# Patient Record
Sex: FEMALE | Race: WHITE | NOT HISPANIC OR LATINO | Employment: OTHER | URBAN - METROPOLITAN AREA
[De-identification: names, ages, dates, MRNs, and addresses within clinical notes are randomized per-mention and may not be internally consistent; named-entity substitution may affect disease eponyms.]

---

## 2023-04-11 LAB
CHLAMYDIA TRACH., AMPLIFIED: NEGATIVE
N. GONORRHEA, AMPLIFIED: NEGATIVE
URINE CULTURE: NORMAL

## 2023-06-22 LAB
ABO GROUP (TYPE) IN BLOOD: NORMAL
ANTIBODY SCREEN: NORMAL
ERYTHROCYTE DISTRIBUTION WIDTH (RATIO) BY AUTOMATED COUNT: 13.2 % (ref 11.5–14.5)
ERYTHROCYTE MEAN CORPUSCULAR HEMOGLOBIN CONCENTRATION (G/DL) BY AUTOMATED: 34.1 G/DL (ref 32–36)
ERYTHROCYTE MEAN CORPUSCULAR VOLUME (FL) BY AUTOMATED COUNT: 91 FL (ref 80–100)
ERYTHROCYTES (10*6/UL) IN BLOOD BY AUTOMATED COUNT: 3.72 X10E12/L (ref 4–5.2)
HEMATOCRIT (%) IN BLOOD BY AUTOMATED COUNT: 34 % (ref 36–46)
HEMOGLOBIN (G/DL) IN BLOOD: 11.6 G/DL (ref 12–16)
LEUKOCYTES (10*3/UL) IN BLOOD BY AUTOMATED COUNT: 8.8 X10E9/L (ref 4.4–11.3)
PLATELETS (10*3/UL) IN BLOOD AUTOMATED COUNT: 267 X10E9/L (ref 150–450)
RH FACTOR: NORMAL

## 2023-06-23 ENCOUNTER — HOSPITAL ENCOUNTER (INPATIENT)
Dept: DATA CONVERSION | Facility: HOSPITAL | Age: 33
Discharge: HOME | End: 2023-06-23
Attending: OBSTETRICS & GYNECOLOGY | Admitting: OBSTETRICS & GYNECOLOGY

## 2023-06-23 DIAGNOSIS — O26.872 CERVICAL SHORTENING, SECOND TRIMESTER (HHS-HCC): ICD-10-CM

## 2023-06-23 DIAGNOSIS — J45.909 UNSPECIFIED ASTHMA, UNCOMPLICATED (HHS-HCC): ICD-10-CM

## 2023-06-23 DIAGNOSIS — Z3A.16 16 WEEKS GESTATION OF PREGNANCY (HHS-HCC): ICD-10-CM

## 2023-06-23 DIAGNOSIS — O99.512 DISEASES OF THE RESPIRATORY SYSTEM COMPLICATING PREGNANCY, SECOND TRIMESTER (HHS-HCC): ICD-10-CM

## 2023-06-23 DIAGNOSIS — O44.22: ICD-10-CM

## 2023-06-23 LAB
ABO GROUP (TYPE) IN BLOOD: NORMAL
ANTIBODY SCREEN: NORMAL
ERYTHROCYTE DISTRIBUTION WIDTH (RATIO) BY AUTOMATED COUNT: 13.2 % (ref 11.5–14.5)
ERYTHROCYTE MEAN CORPUSCULAR HEMOGLOBIN CONCENTRATION (G/DL) BY AUTOMATED: 33.6 G/DL (ref 32–36)
ERYTHROCYTE MEAN CORPUSCULAR VOLUME (FL) BY AUTOMATED COUNT: 94 FL (ref 80–100)
ERYTHROCYTES (10*6/UL) IN BLOOD BY AUTOMATED COUNT: 3.99 X10E12/L (ref 4–5.2)
HEMATOCRIT (%) IN BLOOD BY AUTOMATED COUNT: 37.5 % (ref 36–46)
HEMOGLOBIN (G/DL) IN BLOOD: 12.6 G/DL (ref 12–16)
LEUKOCYTES (10*3/UL) IN BLOOD BY AUTOMATED COUNT: 7.8 X10E9/L (ref 4.4–11.3)
NRBC (PER 100 WBCS) BY AUTOMATED COUNT: 0 /100 WBC (ref 0–0)
PLATELETS (10*3/UL) IN BLOOD AUTOMATED COUNT: 258 X10E9/L (ref 150–450)
RH FACTOR: NORMAL
SYPHILIS TOTAL AB: NONREACTIVE

## 2023-08-31 LAB
ABO GROUP (TYPE) IN BLOOD: NORMAL
ANTIBODY SCREEN: NORMAL
ERYTHROCYTE DISTRIBUTION WIDTH (RATIO) BY AUTOMATED COUNT: 12.7 % (ref 11.5–14.5)
ERYTHROCYTE MEAN CORPUSCULAR HEMOGLOBIN CONCENTRATION (G/DL) BY AUTOMATED: 33.8 G/DL (ref 32–36)
ERYTHROCYTE MEAN CORPUSCULAR VOLUME (FL) BY AUTOMATED COUNT: 94 FL (ref 80–100)
ERYTHROCYTES (10*6/UL) IN BLOOD BY AUTOMATED COUNT: 3.73 X10E12/L (ref 4–5.2)
GLUCOSE, 1 HR SCREEN, PREG: 104 MG/DL
HEMATOCRIT (%) IN BLOOD BY AUTOMATED COUNT: 35.2 % (ref 36–46)
HEMOGLOBIN (G/DL) IN BLOOD: 11.9 G/DL (ref 12–16)
HEPATITIS B VIRUS SURFACE AG PRESENCE IN SERUM: NONREACTIVE
HEPATITIS C VIRUS AB PRESENCE IN SERUM: NONREACTIVE
HIV 1/ 2 AG/AB SCREEN: NONREACTIVE
LEUKOCYTES (10*3/UL) IN BLOOD BY AUTOMATED COUNT: 7.5 X10E9/L (ref 4.4–11.3)
PLATELETS (10*3/UL) IN BLOOD AUTOMATED COUNT: 217 X10E9/L (ref 150–450)
REFLEX ADDED, ANEMIA PANEL: ABNORMAL
RH FACTOR: NORMAL
RUBELLA VIRUS IGG AB: POSITIVE
SYPHILIS TOTAL AB: NONREACTIVE

## 2023-09-03 LAB
HEMOGLOBIN A2: 2.8 %
HEMOGLOBIN A: 97.1 %
HEMOGLOBIN F: 0.1 %
HEMOGLOBIN IDENTIFICATION INTERPRETATION: NORMAL
PATH REVIEW-HGB IDENTIFICATION: NORMAL

## 2023-09-07 VITALS — BODY MASS INDEX: 25.1 KG/M2 | WEIGHT: 127.87 LBS | HEIGHT: 60 IN

## 2023-09-30 NOTE — H&P
HPI/OB History:   Prenatal Care Provider: Enio CADENA Descriptive Info:  ·  HPI    31yo  at 16.2wga by LMP c/w 12 week US presenting for Delaney cerclage placement.    Pregnancy Notable For  - short cervix 2.4 cm on  scan, no funneling  - marginal previa  - h/o PTB at 34 wga  - anxiety, clonazepam 0.5 mg daily, sees therapust  - ASCUS pap HPV other+ 2022  - exercise induced asthma    OBHx: 34w spontaneous  birth 4#6, 3d in NICU  GynHx: abnormal paps with colpo  MedHx: exercise induced asthma, GERD, anxiety  SurgHx: denies  Meds: PNV, unisom  All: NKDA  FamHx: noncontributory  SocHx: denies x.3      Prenatal Labs:   Prenatal Labs:    Prenatal Labs:   Blood Type: not ordered   Blood Type Comments: Selected manually 'not ordered'  at 2023 20:01   Rhogam Comments: Selected manually 'not ordered'  at 2023 20:01   Chlamydia Date: 10-Apr-2023   Chlamydia Results: negative   Chlamydia Comments: Selected manually 'negative'  at 2023 20:01   Gonorrhea Date: 10-Apr-2023   Gonorrhea Results: negative   Gonorrhea Comments: Selected manually 'negative'  at 2023 20:01   Strep Results: not ordered   Group B Strep Comments: Selected manually 'not  ordered' at 2023 20:01   HBsAG Results: not ordered   HBsAG Comments: Selected manually 'not ordered'  at 2023 20:01   HIV Results: not ordered   HIV Comments: Selected manually 'not ordered' at  2023 20:01   Rubella Results: not ordered   Rubella Comments: Selected manually 'not ordered'  at 2023 20:01   Syphilis Results: not ordered   Syphilis Comments: Selected manually 'not ordered'  at 2023 20:01     Antepartum/Postpartum:   Antepartum/PP:  Final SONYA 06-Dec-2023   Current EGA: 16.1   Patient is > or = 35.0 wks EGA no, EFW is n/a   Fetal Presentation not applicable   Fetal presentation verified by not applicable   Vaginal Bleeding No   Contractions/Abdominal Pain No   Discharge/Loss of Fluid No    Fetal Movement None   Hemorrhage Low Risk Factors (T&S) patient with no medium or high risk factors   Hemorrhage Risk Assessment hemorrhage risk completed on admission   Hemorrhage Risk Score Patient is at Low Risk for an OB hemorrhage. Order Type & Screen.   TOLAC no   Did this patient receive progesterone in any form to prevent premature delivery? no   Does patient desire postplacental IUD? no              Allergies:  ·  Ceclor : Rash    Medications Prior to Admission:   Admission Medication Reconciliation has not been completed for this patient.    Review of Systems:   All Other Systems: All other systems reviewed and  are negative     Objective:   Physical Exam by System:    Constitutional: No apparent distress.   Eyes: pupils equal, sclerae clear   Head/Neck: neck supple   Respiratory/Thorax: normal respiratory effort, lungs  clear, no wheezes or rhonchi   Cardiovascular: RRR   Gastrointestinal: Gravid, non tender   Musculoskeletal: Normal extremities   Neurological: no focal deficits   Psychological: Appropriate affect and mood   Skin: no rashes or lesions     Recent Lab Results:    Results:    CBC: 2023 10:07              \     Hgb     /                              \     11.6 L    /  WBC  ----------------  Plt               8.8       ----------------    267              /     Hct     \                              /     34.0 L    \            RBC: 3.72 L    MCV: 91         Assessment and Plan:   Assessment:    33yo  at 16.2wga by LMP c/w 12 week US presenting for Delaney cerclage placement.    Cerclage placement  - admit, consent obtained prior to ativan 1mg (anxiety)  - plan for cerclage placement today  - plan for spinal for cerclage placement  - patient will be obs for resolution of spinal  - if no complications, patient may be discharged home today     D/w Dr. Schaffer    Patient seen and evaluated with resident plan.  Symone Schaffer MD  MFJOSE Attending        Electronic  Signatures:  Magi Daily (Resident))  (Signed 22-Jun-2023 20:02)   Authored: HPI/OB History, Prenatal Labs, Antepartum/PP,  Allergies, Medications Prior to Admission, Review of Systems, Objective, Assessment and Plan  Symone Schaffer)  (Signed 23-Jun-2023 13:13)   Authored: Assessment and Plan, Note Completion      Last Updated: 23-Jun-2023 13:13 by Symone Schaffer)

## 2023-09-30 NOTE — H&P
HPI/OB History:   Prenatal Care Provider: Dr. Steen     HPI Descriptive Info:  ·  HPI    Anxious about wait time and upcoming procedure. Denies vaginal bleeding, leakage of fluid, abdominal pain.      Prenatal Labs:   Prenatal Labs:    Prenatal Labs:   Blood Typed Date: 23-Jun-2023   Blood Type: pending result   Blood Type Comments: Currently Collected as of  Jun 23 2023 12:00PM   Chlamydia Date: 10-Apr-2023   Chlamydia Results: negative   Chlamydia Comments: Selected manually 'negative'  at 22-Jun-2023 20:01   Gonorrhea Date: 10-Apr-2023   Gonorrhea Results: negative   Gonorrhea Comments: Selected manually 'negative'  at 22-Jun-2023 20:01   Strep Results: not ordered   Group B Strep Comments: Selected manually 'not  ordered' at 22-Jun-2023 20:01   HBsAG Results: not ordered   HBsAG Comments: Selected manually 'not ordered'  at 22-Jun-2023 20:01   HIV Results: not ordered   HIV Comments: Selected manually 'not ordered' at  22-Jun-2023 20:01   Rubella Results: not ordered   Rubella Comments: Selected manually 'not ordered'  at 22-Jun-2023 20:01   Syphilis (mmm-dd-yyyy): 23-Jun-2023   Syphilis Results: pending result   Syphilis Comments: Currently Collected as of Jun 23 2023 12:00PM     Antepartum/Postpartum:   Antepartum/PP:  Final SONYA 06-Dec-2023   Current EGA: 16.2   Patient is > or = 35.0 wks EGA no, EFW is n/a   Fetal Presentation not applicable   Fetal presentation verified by not applicable   Vaginal Bleeding No   Contractions/Abdominal Pain No   Discharge/Loss of Fluid No   Hemorrhage Low Risk Factors (T&S) patient with no medium or high risk factors   Hemorrhage Risk Assessment hemorrhage risk completed on admission   Hemorrhage Risk Score Patient is at Low Risk for an OB hemorrhage. Order Type & Screen.   TOLAC no   Did this patient receive progesterone in any form to prevent premature delivery? no   Does patient desire postplacental IUD? no       Social History:   Social History:  Smoking Status never  smoker (1)   Alcohol Use denies(1)              Allergies:  ·  Ceclor : Rash    Medications Prior to Admission:   Admission Medication Reconciliation has not been completed for this patient.    Objective:     Objective Information:      T   P  R  BP   MAP  SpO2   Value  36.6  70     105/51   74  100%  Date/Time  11:32  11:32    11:32   11:32  11:32  Range  (36.6C - 36.6C )  (70 - 70 )    (105 - 105 )/ (51 - 51 )  (74 - 74 )  (100% - 100% )    Physical Exam by System:    Constitutional: alert, oriented   Obstetric: Gravid abdomen     Recent Lab Results:    Results:    CBC: 2023 10:07              \     Hgb     /                              \     11.6 L    /  WBC  ----------------  Plt               8.8       ----------------    267              /     Hct     \                              /     34.0 L    \            RBC: 3.72 L    MCV: 91         Assessment and Plan:   Assessment:    31yo  at 16.2wga by LMP c/w 12 week US presenting for Delaney cerclage placement.      US and History indicated Cerclage  -Short cervix 2.4cm on  scan  - Admit to L&D, consented for procedure. Risks and benefits discussed with patient,  previously counseled outpatient.    Maternal well-being  - T&S/CBC on admission  -PNL's reviewed up to dateFetal well-being  -  on doppler     IUP @ 16.2wga  -PNL's reviewed UTD  -For outpatient follow up with MFM in 2 weeks  -For RTC prenatal visit in 4 weeks    d/w Dr. Sarbjit Jones MD PGY-2  MFM Pager [phone]       Attestation:   Note Completion:  I am a:  Resident/Fellow   Attending Attestation I saw and evaluated the patient.  I personally obtained the key and critical portions of the history and physical exam or was physically present for key and  critical portions performed by the resident/fellow. I reviewed the resident/fellow?s documentation and discussed the patient with the resident/fellow.  I agree with the resident/fellow?s medical  "decision making as documented in the note.     I personally evaluated the patient on 23-Jun-2023         Electronic Signatures:  Palomo Jones (Resident))  (Signed 23-Jun-2023 12:19)   Authored: HPI/OB History, Prenatal Labs, Antepartum/PP,  Social History, Allergies, Medications Prior to Admission, Objective, Assessment and Plan, Note Completion  Symone Schaffer)  (Signed 24-Jun-2023 15:15)   Authored: HPI/OB History, Note Completion   Co-Signer: HPI/OB History, Prenatal Labs, Antepartum/PP, Social History, Allergies, Medications Prior to Admission, Objective, Assessment  and Plan, Note Completion      Last Updated: 24-Jun-2023 15:15 by Symone Schaffer)    References:  1.  Data Referenced From \"Patient Profile - OB v3\" 23-Jun-2023 11:12   "

## 2023-10-02 PROBLEM — O44.03 PLACENTA PREVIA IN THIRD TRIMESTER (HHS-HCC): Status: ACTIVE | Noted: 2023-10-02

## 2023-10-02 PROBLEM — O26.879 SHORT CERVIX AFFECTING PREGNANCY (HHS-HCC): Status: ACTIVE | Noted: 2023-10-02

## 2023-10-02 PROBLEM — O09.93 SUPERVISION OF HIGH RISK PREGNANCY IN THIRD TRIMESTER (HHS-HCC): Status: ACTIVE | Noted: 2023-10-02

## 2023-10-02 NOTE — ASSESSMENT & PLAN NOTE
- short cervix seen at 16 weeks, s/p cerclage placement   - desires elective steroid at 34w  - will plan for first dose here in office and partner will give second one at home   -Plan for cerclage removal at the time of CD

## 2023-10-02 NOTE — ASSESSMENT & PLAN NOTE
- Still present, re-reviewed finding of persistent previa on US as well as finding on US of anterior PCI and risk of developing vasa previa. Reviewed implications of persistent placenta previa as well as vasa previa and impact on delivery timing and method.   - Discussed if remains a previa and no bleeding plan for 36-37w. Discussed likely plan for C/D on 11/9 with Dr. Kaplan

## 2023-10-02 NOTE — OP NOTE
Post Operative Note:     PreOp Diagnosis: Short cervix, history of   delivery   Post-Procedure Diagnosis: same, s/p Anton Cerclage   Procedure: 1. Sloane cerclage   Surgeon: Karen Schaffer MD   Resident/Fellow/Other Assistant: Michelle Dawson midwifery  student   Anesthesia: Spinal   Estimated Blood Loss (mL): 5cc   Specimen: no   Complications: None   Findings: Short cervix, closed     Operative Report Dictated:  Dictation: not applicable - note contains Operative  Report   Operative Report:    The patient was taken to the operating room where spinal anesthesia was obtained. After confirmation of adequate anesthesia, the patient was positioned in the dorsal  lithotomy position. The vaginal area was cleansed with betadine. She was draped in the usual sterile fashion. Examination revealed a short but closed cervix. The anterior and posterior cervix were grasped with ring forceps. A Anton cerclage was placed  in the usual sterile fashion with 5mm Mersilene tape. The cervix was again examined and noted to be closed. She was taken to the recovery room in stable condition.      Electronic Signatures:  Symone Schaffer)  (Signed 2023 18:53)   Authored: Post Operative Note, Note Completion      Last Updated: 2023 18:53 by Symone Schaffer)

## 2023-10-02 NOTE — ASSESSMENT & PLAN NOTE
- PNB completed. Reviewed lab results. Normal. 1hr gtt 104. Hgb WNL.   - last pap ASCUS pos HPC, s/p normal colpo. Due in 9/23, will get PP   - s/p tdap vaccine    - PNV x2 wks with growth

## 2023-10-03 ENCOUNTER — APPOINTMENT (OUTPATIENT)
Dept: OBSTETRICS AND GYNECOLOGY | Facility: CLINIC | Age: 33
End: 2023-10-03
Payer: COMMERCIAL

## 2023-10-03 ENCOUNTER — ROUTINE PRENATAL (OUTPATIENT)
Dept: MATERNAL FETAL MEDICINE | Facility: CLINIC | Age: 33
End: 2023-10-03
Payer: COMMERCIAL

## 2023-10-03 VITALS — WEIGHT: 139.6 LBS | SYSTOLIC BLOOD PRESSURE: 113 MMHG | DIASTOLIC BLOOD PRESSURE: 77 MMHG | BODY MASS INDEX: 25.53 KG/M2

## 2023-10-03 DIAGNOSIS — O99.343 ANXIETY IN PREGNANCY IN THIRD TRIMESTER, ANTEPARTUM (HHS-HCC): Primary | ICD-10-CM

## 2023-10-03 DIAGNOSIS — F41.9 ANXIETY IN PREGNANCY IN THIRD TRIMESTER, ANTEPARTUM (HHS-HCC): Primary | ICD-10-CM

## 2023-10-03 DIAGNOSIS — O26.879 SHORT CERVIX AFFECTING PREGNANCY (HHS-HCC): ICD-10-CM

## 2023-10-03 DIAGNOSIS — O09.93 SUPERVISION OF HIGH RISK PREGNANCY IN THIRD TRIMESTER (HHS-HCC): ICD-10-CM

## 2023-10-03 DIAGNOSIS — O44.03 PLACENTA PREVIA IN THIRD TRIMESTER (HHS-HCC): ICD-10-CM

## 2023-10-03 PROCEDURE — 99214 OFFICE O/P EST MOD 30 MIN: CPT | Performed by: NURSE PRACTITIONER

## 2023-10-03 NOTE — ASSESSMENT & PLAN NOTE
"-H/O medical anxiety and postpartum depression  -Has a psych provider- has discussed starting zoloft every day and clonazepam PRN for panic attacks  -Would like to avoid PP depression and reports it was \"bad\" following her last delivery. Reviewed best to start 4-6 wks before planned delivery given these medications can take that amount of time for full benefit   -Reviewed plan for Cesection in detail. Given her anxiety she is ok with delivery downtown although had originally talked about delivery at [facility]. RN to schedule C-Elviraan for 11/9 with Dr. Kaplan     "

## 2023-10-03 NOTE — PROGRESS NOTES
Subjective   Patient ID [id number]   Lexi Licona is a 33 y.o.  at 30w6d with a working estimated date of delivery of 2023, by Ultrasound who presents for a routine prenatal visit. She denies vaginal bleeding, leakage of fluid. Occasional BH contractions. Endorses +FM.     36 wks on - would like to schedule .     Her pregnancy is complicated by:  Placenta previa   Short cervix   H/O Anxiety      Objective   Physical Exam:   Weight: 63.3 kg (139 lb 9.6 oz)  Expected Total Weight Gain: Could not be calculated   Pregravid BMI: Could not be calculated  BP: 113/77  Fetal Heart Rate: 150               Prenatal Labs  Urine Dip:    Lab Results   Component Value Date    HGB 11.9 (L) 2023    HCT 35.2 (L) 2023    HEPBSAG NONREACTIVE 2023                Assessment/Plan   Problem List Items Addressed This Visit             ICD-10-CM    Short cervix affecting pregnancy O26.879     - short cervix seen at 16 weeks, s/p cerclage placement   - desires elective steroid at 34w  - will plan for first dose here in office and partner will give second one at home   -Plan for cerclage removal at the time of CD          Placenta previa in third trimester O44.03     - Still present, re-reviewed finding of persistent previa on US as well as finding on US of anterior PCI and risk of developing vasa previa. Reviewed implications of persistent placenta previa as well as vasa previa and impact on delivery timing and method.   - Discussed if remains a previa and no bleeding plan for 36-37w. Discussed likely plan for C/D on  with Dr. Kaplan         Supervision of high risk pregnancy in third trimester O09.93     - PNB completed. Reviewed lab results. Normal. 1hr gtt 104. Hgb WNL.   - last pap ASCUS pos HPC, s/p normal colpo. Due in , will get PP   - s/p tdap vaccine    - PNV x2 wks with growth          Anxiety in pregnancy in third trimester, antepartum - Primary O99.343, F41.9     -H/O medical  "anxiety and postpartum depression  -Has a psych provider- has discussed starting zoloft every day and clonazepam PRN for panic attacks  -Would like to avoid PP depression and reports it was \"bad\" following her last delivery. Reviewed best to start 4-6 wks before planned delivery given these medications can take that amount of time for full benefit   -Reviewed plan for Cesection in detail. Given her anxiety she is ok with delivery downtown although had originally talked about delivery at [facility]. RN to schedule C-Searean for  with Dr. Kaplan             Continue prenatal vitamin.  Expected mode of delivery  due to previa at 36-37 wks- to be scheduled, desires  with this CNP to assist Dr. Kaplan if possible  Plans to breastfeed    Follow up in 1 week for a routine prenatal visit.    Ariana Washington, BIANKA-CNP    "

## 2023-10-04 ENCOUNTER — PREP FOR PROCEDURE (OUTPATIENT)
Dept: MATERNAL FETAL MEDICINE | Facility: HOSPITAL | Age: 33
End: 2023-10-04
Payer: COMMERCIAL

## 2023-10-04 DIAGNOSIS — O44.03 PLACENTA PREVIA IN THIRD TRIMESTER (HHS-HCC): Primary | ICD-10-CM

## 2023-10-04 DIAGNOSIS — O26.879 SHORT CERVIX AFFECTING PREGNANCY (HHS-HCC): ICD-10-CM

## 2023-10-05 ENCOUNTER — PREP FOR PROCEDURE (OUTPATIENT)
Dept: MATERNAL FETAL MEDICINE | Facility: HOSPITAL | Age: 33
End: 2023-10-05
Payer: COMMERCIAL

## 2023-10-07 ENCOUNTER — TELEPHONE (OUTPATIENT)
Dept: OBSTETRICS AND GYNECOLOGY | Facility: HOSPITAL | Age: 33
End: 2023-10-07
Payer: COMMERCIAL

## 2023-10-07 ENCOUNTER — HOSPITAL ENCOUNTER (OUTPATIENT)
Facility: HOSPITAL | Age: 33
Discharge: HOME | End: 2023-10-08
Attending: OBSTETRICS & GYNECOLOGY | Admitting: OBSTETRICS & GYNECOLOGY
Payer: COMMERCIAL

## 2023-10-07 PROBLEM — F41.9 ANXIETY IN PREGNANCY IN THIRD TRIMESTER, ANTEPARTUM (HHS-HCC): Status: RESOLVED | Noted: 2023-10-03 | Resolved: 2023-10-07

## 2023-10-07 PROBLEM — O09.219: Status: ACTIVE | Noted: 2023-10-07

## 2023-10-07 PROBLEM — O99.343 ANXIETY IN PREGNANCY IN THIRD TRIMESTER, ANTEPARTUM (HHS-HCC): Status: RESOLVED | Noted: 2023-10-03 | Resolved: 2023-10-07

## 2023-10-07 LAB
APTT PPP: 26 SECONDS (ref 27–38)
ERYTHROCYTE [DISTWIDTH] IN BLOOD BY AUTOMATED COUNT: 12.6 % (ref 11.5–14.5)
FIBRINOGEN PPP-MCNC: 346 MG/DL (ref 200–400)
HCT VFR BLD AUTO: 33.5 % (ref 36–46)
HGB BLD-MCNC: 11.2 G/DL (ref 12–16)
INR PPP: 0.9 (ref 0.9–1.1)
MCH RBC QN AUTO: 31.9 PG (ref 26–34)
MCHC RBC AUTO-ENTMCNC: 33.4 G/DL (ref 32–36)
MCV RBC AUTO: 95 FL (ref 80–100)
NRBC BLD-RTO: 0 /100 WBCS (ref 0–0)
PLATELET # BLD AUTO: 212 X10*3/UL (ref 150–450)
PMV BLD AUTO: 10 FL (ref 7.5–11.5)
PROTHROMBIN TIME: 9.6 SECONDS (ref 9.8–12.8)
RBC # BLD AUTO: 3.51 X10*6/UL (ref 4–5.2)
WBC # BLD AUTO: 9.5 X10*3/UL (ref 4.4–11.3)

## 2023-10-07 PROCEDURE — 87081 CULTURE SCREEN ONLY: CPT

## 2023-10-07 PROCEDURE — 86780 TREPONEMA PALLIDUM: CPT | Performed by: STUDENT IN AN ORGANIZED HEALTH CARE EDUCATION/TRAINING PROGRAM

## 2023-10-07 PROCEDURE — 85027 COMPLETE CBC AUTOMATED: CPT | Performed by: STUDENT IN AN ORGANIZED HEALTH CARE EDUCATION/TRAINING PROGRAM

## 2023-10-07 PROCEDURE — 85384 FIBRINOGEN ACTIVITY: CPT | Performed by: STUDENT IN AN ORGANIZED HEALTH CARE EDUCATION/TRAINING PROGRAM

## 2023-10-07 PROCEDURE — 36415 COLL VENOUS BLD VENIPUNCTURE: CPT

## 2023-10-07 PROCEDURE — 85610 PROTHROMBIN TIME: CPT | Performed by: STUDENT IN AN ORGANIZED HEALTH CARE EDUCATION/TRAINING PROGRAM

## 2023-10-07 PROCEDURE — 86850 RBC ANTIBODY SCREEN: CPT | Performed by: STUDENT IN AN ORGANIZED HEALTH CARE EDUCATION/TRAINING PROGRAM

## 2023-10-07 SDOH — ECONOMIC STABILITY: HOUSING INSECURITY: DO YOU FEEL UNSAFE GOING BACK TO THE PLACE WHERE YOU ARE LIVING?: NO

## 2023-10-07 SDOH — SOCIAL STABILITY: SOCIAL INSECURITY: ABUSE SCREEN: ADULT

## 2023-10-07 SDOH — SOCIAL STABILITY: SOCIAL INSECURITY: DO YOU FEEL ANYONE HAS EXPLOITED OR TAKEN ADVANTAGE OF YOU FINANCIALLY OR OF YOUR PERSONAL PROPERTY?: NO

## 2023-10-07 SDOH — SOCIAL STABILITY: SOCIAL INSECURITY: ARE YOU OR HAVE YOU BEEN THREATENED OR ABUSED PHYSICALLY, EMOTIONALLY, OR SEXUALLY BY ANYONE?: NO

## 2023-10-07 SDOH — HEALTH STABILITY: MENTAL HEALTH: NON-SPECIFIC ACTIVE SUICIDAL THOUGHTS (PAST 1 MONTH): NO

## 2023-10-07 SDOH — HEALTH STABILITY: MENTAL HEALTH: SUICIDAL BEHAVIOR (LIFETIME): NO

## 2023-10-07 SDOH — SOCIAL STABILITY: SOCIAL INSECURITY: VERBAL ABUSE: DENIES

## 2023-10-07 SDOH — HEALTH STABILITY: MENTAL HEALTH: HAVE YOU USED ANY PRESCRIPTION DRUGS OTHER THAN PRESCRIBED IN THE PAST 12 MONTHS?: NO

## 2023-10-07 SDOH — SOCIAL STABILITY: SOCIAL INSECURITY: ARE THERE ANY APPARENT SIGNS OF INJURIES/BEHAVIORS THAT COULD BE RELATED TO ABUSE/NEGLECT?: NO

## 2023-10-07 SDOH — SOCIAL STABILITY: SOCIAL INSECURITY: HAVE YOU HAD THOUGHTS OF HARMING ANYONE ELSE?: NO

## 2023-10-07 SDOH — HEALTH STABILITY: MENTAL HEALTH: HAVE YOU USED ANY SUBSTANCES (CANABIS, COCAINE, HEROIN, HALLUCINOGENS, INHALANTS, ETC.) IN THE PAST 12 MONTHS?: NO

## 2023-10-07 SDOH — HEALTH STABILITY: MENTAL HEALTH: WISH TO BE DEAD (PAST 1 MONTH): NO

## 2023-10-07 SDOH — SOCIAL STABILITY: SOCIAL INSECURITY: HAS ANYONE EVER THREATENED TO HURT YOUR FAMILY OR YOUR PETS?: NO

## 2023-10-07 SDOH — SOCIAL STABILITY: SOCIAL INSECURITY: DOES ANYONE TRY TO KEEP YOU FROM HAVING/CONTACTING OTHER FRIENDS OR DOING THINGS OUTSIDE YOUR HOME?: NO

## 2023-10-07 SDOH — SOCIAL STABILITY: SOCIAL INSECURITY: PHYSICAL ABUSE: DENIES

## 2023-10-07 ASSESSMENT — LIFESTYLE VARIABLES
AUDIT-C TOTAL SCORE: 0
HOW MANY STANDARD DRINKS CONTAINING ALCOHOL DO YOU HAVE ON A TYPICAL DAY: PATIENT DOES NOT DRINK
HOW OFTEN DO YOU HAVE A DRINK CONTAINING ALCOHOL: NEVER
AUDIT-C TOTAL SCORE: 0
HOW OFTEN DO YOU HAVE 6 OR MORE DRINKS ON ONE OCCASION: NEVER
SKIP TO QUESTIONS 9-10: 1

## 2023-10-07 ASSESSMENT — PATIENT HEALTH QUESTIONNAIRE - PHQ9
2. FEELING DOWN, DEPRESSED OR HOPELESS: NOT AT ALL
SUM OF ALL RESPONSES TO PHQ9 QUESTIONS 1 & 2: 0
1. LITTLE INTEREST OR PLEASURE IN DOING THINGS: NOT AT ALL

## 2023-10-07 ASSESSMENT — PAIN SCALES - GENERAL
PAINLEVEL_OUTOF10: 0 - NO PAIN

## 2023-10-08 VITALS
BODY MASS INDEX: 26.33 KG/M2 | DIASTOLIC BLOOD PRESSURE: 73 MMHG | WEIGHT: 143.08 LBS | RESPIRATION RATE: 16 BRPM | OXYGEN SATURATION: 100 % | SYSTOLIC BLOOD PRESSURE: 105 MMHG | TEMPERATURE: 97.7 F | HEIGHT: 62 IN | HEART RATE: 83 BPM

## 2023-10-08 LAB
ABO GROUP (TYPE) IN BLOOD: NORMAL
ANTIBODY SCREEN: NORMAL
RH FACTOR (ANTIGEN D): NORMAL
T PALLIDUM AB SER QL: NONREACTIVE

## 2023-10-08 PROCEDURE — 99214 OFFICE O/P EST MOD 30 MIN: CPT | Mod: 25

## 2023-10-08 PROCEDURE — 99214 OFFICE O/P EST MOD 30 MIN: CPT

## 2023-10-08 PROCEDURE — 36415 COLL VENOUS BLD VENIPUNCTURE: CPT

## 2023-10-08 NOTE — TELEPHONE ENCOUNTER
Telephone Call    Patient called triage line. Name and  confirmed.    Patient of MFM.     37 yo  at 31w3d c/b Anton cerclage and placenta previa reports an episode of brown discharge. Denies contractions/cramping. Denies LOF. Reports good fetal movement. Given patient's history and current previa/cerclage, recommend patient present to L&D for further evaluation at this time.     Lucina Baldwin MD  OBGYN Attending

## 2023-10-10 LAB — GP B STREP GENITAL QL CULT: NORMAL

## 2023-10-17 ENCOUNTER — ANCILLARY PROCEDURE (OUTPATIENT)
Dept: RADIOLOGY | Facility: CLINIC | Age: 33
End: 2023-10-17
Payer: COMMERCIAL

## 2023-10-17 ENCOUNTER — ROUTINE PRENATAL (OUTPATIENT)
Dept: MATERNAL FETAL MEDICINE | Facility: CLINIC | Age: 33
End: 2023-10-17
Payer: COMMERCIAL

## 2023-10-17 ENCOUNTER — APPOINTMENT (OUTPATIENT)
Dept: RADIOLOGY | Facility: CLINIC | Age: 33
End: 2023-10-17
Payer: COMMERCIAL

## 2023-10-17 ENCOUNTER — PREP FOR PROCEDURE (OUTPATIENT)
Dept: OBSTETRICS AND GYNECOLOGY | Facility: HOSPITAL | Age: 33
End: 2023-10-17

## 2023-10-17 VITALS — SYSTOLIC BLOOD PRESSURE: 106 MMHG | WEIGHT: 140.3 LBS | DIASTOLIC BLOOD PRESSURE: 71 MMHG | BODY MASS INDEX: 25.66 KG/M2

## 2023-10-17 DIAGNOSIS — Z32.01 PREGNANCY TEST POSITIVE (HHS-HCC): ICD-10-CM

## 2023-10-17 DIAGNOSIS — O09.219: ICD-10-CM

## 2023-10-17 DIAGNOSIS — O26.879 SHORT CERVIX AFFECTING PREGNANCY (HHS-HCC): Primary | ICD-10-CM

## 2023-10-17 DIAGNOSIS — O44.00 PLACENTA PREVIA (HHS-HCC): ICD-10-CM

## 2023-10-17 DIAGNOSIS — O36.5990 PREGNANCY AFFECTED BY FETAL GROWTH RESTRICTION (HHS-HCC): ICD-10-CM

## 2023-10-17 DIAGNOSIS — O44.03 PLACENTA PREVIA IN THIRD TRIMESTER (HHS-HCC): ICD-10-CM

## 2023-10-17 DIAGNOSIS — O09.93 SUPERVISION OF HIGH RISK PREGNANCY IN THIRD TRIMESTER (HHS-HCC): ICD-10-CM

## 2023-10-17 LAB
POC APPEARANCE, URINE: CLEAR
POC BILIRUBIN, URINE: NEGATIVE
POC BLOOD, URINE: ABNORMAL
POC COLOR, URINE: YELLOW
POC GLUCOSE, URINE: NEGATIVE MG/DL
POC KETONES, URINE: NEGATIVE MG/DL
POC LEUKOCYTES, URINE: ABNORMAL
POC NITRITE,URINE: NEGATIVE
POC PH, URINE: 8.5 PH
POC PROTEIN, URINE: NEGATIVE MG/DL
POC SPECIFIC GRAVITY, URINE: 1.02
POC UROBILINOGEN, URINE: 0.2 EU/DL

## 2023-10-17 PROCEDURE — 76816 OB US FOLLOW-UP PER FETUS: CPT

## 2023-10-17 PROCEDURE — 99213 OFFICE O/P EST LOW 20 MIN: CPT | Mod: 25 | Performed by: NURSE PRACTITIONER

## 2023-10-17 PROCEDURE — 76819 FETAL BIOPHYS PROFIL W/O NST: CPT | Performed by: OBSTETRICS & GYNECOLOGY

## 2023-10-17 PROCEDURE — 76816 OB US FOLLOW-UP PER FETUS: CPT | Performed by: OBSTETRICS & GYNECOLOGY

## 2023-10-17 PROCEDURE — 81003 URINALYSIS AUTO W/O SCOPE: CPT | Mod: QW | Performed by: NURSE PRACTITIONER

## 2023-10-17 PROCEDURE — 76819 FETAL BIOPHYS PROFIL W/O NST: CPT

## 2023-10-17 PROCEDURE — 76817 TRANSVAGINAL US OBSTETRIC: CPT | Performed by: OBSTETRICS & GYNECOLOGY

## 2023-10-17 PROCEDURE — 76817 TRANSVAGINAL US OBSTETRIC: CPT

## 2023-10-17 PROCEDURE — 99213 OFFICE O/P EST LOW 20 MIN: CPT | Performed by: NURSE PRACTITIONER

## 2023-10-17 NOTE — PROGRESS NOTES
Lexi Licona is a 33 y.o. at 32w6d here for F/U prenatal visit with MFM    Her pregnancy is complicated by:   Short cervix/cerclage/h/o  delivery at 34 wks     Overall she reports feeling well. Feeling +FM. Denies VB, LOF, or CTXs.     Concerns today: no new concerns    Was seen in triage on 10/7 for concerns for abnormal discharge. She was monitored and had labs. Ultimately discharged home. GBS collected in triage and was negative.     Vitals:    10/17/23 1000   BP: 106/71       Gen-NAD  Cardiac- good peripheral perfusion  Respiratory- non-labored breathing  Abdomen- soft, non-tender, gravid   Extremities- symmetrical   Pelvic- deferred      Sono- continues to be a previa, velamentous vs marginal cord insertion, EFW 29%, AC 35%    Problem List Items Addressed This Visit          Pregnancy    Short cervix affecting pregnancy - Primary    Overview       - short cervix seen at 16 weeks, s/p cerclage placement   - initially desired elective steroids at 34w, reviewed today and pt feels more confident with delivery at 36 wks, thoroughly counseled. Does not desire steroids now  - will plan for first dose here in office and partner will give second one at home   -Plan for cerclage removal at the time of CD           Placenta previa in third trimester    Overview     -Velamentous cord insertion vs marginal cord insertion on US today- Still present, re-reviewed finding of persistent previa on US as well as finding on US of anterior PCI and risk of developing vasa previa. Reviewed implications of persistent placenta previa as well as vasa previa and impact on delivery timing and method.   - Discussed if remains a previa and no bleeding plan for 36-37w. Discussed likely plan for C/D on  with Dr. Kaplan         Supervision of high risk pregnancy in third trimester    Overview     - PNB completed. Reviewed lab results. Normal. 1hr gtt 104. Hgb WNL.   - last pap ASCUS pos HPC, s/p normal colpo. Due in , will get PP    - s/p tdap vaccine  -Growth today   -GBS completed in triage on 10/7-negative   - PNV x2 wks          Relevant Orders    POC Urine Dip (Completed)    Prior  labor, antepartum    Overview     H/o 34 wga delivery  See short cervix dx                RTC weekly until delivery  Planned  on   Reviewed enhanced recovery protocol today   Will need Type and Cross on admission for potential transfusion  Discussed CBC and T&S 72-48hrs prior to scheduled       Ariana Washington, APRN-CNP

## 2023-10-24 ENCOUNTER — ROUTINE PRENATAL (OUTPATIENT)
Dept: MATERNAL FETAL MEDICINE | Facility: CLINIC | Age: 33
End: 2023-10-24
Payer: COMMERCIAL

## 2023-10-24 VITALS — DIASTOLIC BLOOD PRESSURE: 68 MMHG | BODY MASS INDEX: 25.79 KG/M2 | SYSTOLIC BLOOD PRESSURE: 99 MMHG | WEIGHT: 141 LBS

## 2023-10-24 DIAGNOSIS — O26.879 SHORT CERVIX AFFECTING PREGNANCY (HHS-HCC): Primary | ICD-10-CM

## 2023-10-24 DIAGNOSIS — O44.03 PLACENTA PREVIA IN THIRD TRIMESTER (HHS-HCC): ICD-10-CM

## 2023-10-24 DIAGNOSIS — O09.93 SUPERVISION OF HIGH RISK PREGNANCY IN THIRD TRIMESTER (HHS-HCC): ICD-10-CM

## 2023-10-24 DIAGNOSIS — O09.219: ICD-10-CM

## 2023-10-24 DIAGNOSIS — Z34.90 PREGNANT (HHS-HCC): ICD-10-CM

## 2023-10-24 LAB
POC APPEARANCE, URINE: CLEAR
POC BILIRUBIN, URINE: NEGATIVE
POC BLOOD, URINE: ABNORMAL
POC COLOR, URINE: YELLOW
POC GLUCOSE, URINE: NEGATIVE MG/DL
POC KETONES, URINE: NEGATIVE MG/DL
POC LEUKOCYTES, URINE: ABNORMAL
POC NITRITE,URINE: NEGATIVE
POC PH, URINE: 8.5 PH
POC PROTEIN, URINE: NEGATIVE MG/DL
POC SPECIFIC GRAVITY, URINE: 1.01
POC UROBILINOGEN, URINE: 0.2 EU/DL

## 2023-10-24 PROCEDURE — 99213 OFFICE O/P EST LOW 20 MIN: CPT | Performed by: NURSE PRACTITIONER

## 2023-10-24 PROCEDURE — 81003 URINALYSIS AUTO W/O SCOPE: CPT | Mod: QW | Performed by: NURSE PRACTITIONER

## 2023-10-24 NOTE — PROGRESS NOTES
Lexi Licona is a 33 y.o. at 33w5d here for F/U prenatal visit with MFM    Her pregnancy is complicated by:   Placenta previa   H/O  delivery    Overall she reports she is doing well. Feeling +FM. Denies VB, LOF, or CTXs.     Concerns today: no new concerns    Visit Vitals  LMP 10/28/2022   OB Status Pregnant   Smoking Status Never      Gen-NAD  Cardiac- good peripheral perfusion  Respiratory- non-labored breathing  Abdomen- soft, non-tender, gravid   Extremities- symmetrical          Problem List Items Addressed This Visit          Pregnancy    Placenta previa in third trimester    Overview     -Velamentous cord insertion vs marginal cord insertion on US today- Still present, re-reviewed finding of persistent previa on US as well as finding on US of anterior PCI and risk of developing vasa previa. Reviewed implications of persistent placenta previa as well as vasa previa and impact on delivery timing and method.   - Discussed if remains a previa and no bleeding plan for 36-37w. Discussed likely plan for C/D on  with Dr. Kaplan         Prior  labor, antepartum    Overview     H/o 34 wga delivery  See short cervix dx          Short cervix affecting pregnancy - Primary    Overview       - short cervix seen at 16 weeks, s/p cerclage placement   - initially desired elective steroids at 34w, reviewed today and pt feels more confident with delivery at 36 wks, thoroughly counseled. Does not desire steroids now  - will plan for first dose here in office and partner will give second one at home   -Plan for cerclage removal at the time of CD           Supervision of high risk pregnancy in third trimester    Overview     - PNB completed. Reviewed lab results. Normal. 1hr gtt 104. Hgb WNL.   - last pap ASCUS pos HPC, s/p normal colpo. Due in , will get PP   - s/p tdap vaccine  -Growth AGA- see report for details    -GBS completed in triage on 10/7-negative   - PNV weekly until delivery   -CD scheduled on  11/9 with Dr. Kaplan          Relevant Orders    POC Urine Dip     Other Visit Diagnoses       Pregnant                 Feeling less anxious with a plan for delivery   RTC x1 wk   Plans for pre surgery labs Mon or Tues prior to her surgery on 11/9 and aware those orders are in       Ariana Washington, APRN-CNP

## 2023-10-26 ENCOUNTER — ANESTHESIA (OUTPATIENT)
Dept: OBSTETRICS AND GYNECOLOGY | Facility: HOSPITAL | Age: 33
End: 2023-10-26
Payer: COMMERCIAL

## 2023-10-26 ENCOUNTER — TELEPHONE (OUTPATIENT)
Dept: OBSTETRICS AND GYNECOLOGY | Facility: CLINIC | Age: 33
End: 2023-10-26

## 2023-10-26 ENCOUNTER — HOSPITAL ENCOUNTER (INPATIENT)
Facility: HOSPITAL | Age: 33
LOS: 2 days | Discharge: HOME | End: 2023-10-28
Attending: OBSTETRICS & GYNECOLOGY | Admitting: STUDENT IN AN ORGANIZED HEALTH CARE EDUCATION/TRAINING PROGRAM
Payer: COMMERCIAL

## 2023-10-26 ENCOUNTER — ANESTHESIA EVENT (OUTPATIENT)
Dept: OBSTETRICS AND GYNECOLOGY | Facility: HOSPITAL | Age: 33
End: 2023-10-26
Payer: COMMERCIAL

## 2023-10-26 PROBLEM — O26.93 PREGNANCY, ANTEPARTUM, COMPLICATIONS, THIRD TRIMESTER (HHS-HCC): Status: ACTIVE | Noted: 2023-10-26

## 2023-10-26 LAB
ABO GROUP (TYPE) IN BLOOD: NORMAL
ALBUMIN SERPL BCP-MCNC: 4.2 G/DL (ref 3.4–5)
ALP SERPL-CCNC: 105 U/L (ref 33–110)
ALT SERPL W P-5'-P-CCNC: 13 U/L (ref 7–45)
ANION GAP SERPL CALC-SCNC: 16 MMOL/L (ref 10–20)
ANTIBODY SCREEN: NORMAL
APTT PPP: 26 SECONDS (ref 27–38)
AST SERPL W P-5'-P-CCNC: 21 U/L (ref 9–39)
BASE EXCESS BLDCOV CALC-SCNC: -1.2 MMOL/L (ref -8.1–-0.5)
BILIRUB SERPL-MCNC: 0.3 MG/DL (ref 0–1.2)
BODY TEMPERATURE: 37 DEGREES CELSIUS
BUN SERPL-MCNC: 12 MG/DL (ref 6–23)
CALCIUM SERPL-MCNC: 9.2 MG/DL (ref 8.6–10.6)
CHLORIDE SERPL-SCNC: 104 MMOL/L (ref 98–107)
CO2 SERPL-SCNC: 21 MMOL/L (ref 21–32)
CREAT SERPL-MCNC: 0.53 MG/DL (ref 0.5–1.05)
ERYTHROCYTE [DISTWIDTH] IN BLOOD BY AUTOMATED COUNT: 13 % (ref 11.5–14.5)
FIBRINOGEN PPP-MCNC: 430 MG/DL (ref 200–400)
GFR SERPL CREATININE-BSD FRML MDRD: >90 ML/MIN/1.73M*2
GLUCOSE SERPL-MCNC: 70 MG/DL (ref 74–99)
HCO3 BLDCOV-SCNC: 25.7 MMOL/L (ref 16–26)
HCT VFR BLD AUTO: 37.4 % (ref 36–46)
HGB BLD-MCNC: 12.8 G/DL (ref 12–16)
INHALED O2 CONCENTRATION: 21 %
INR PPP: 0.9 (ref 0.9–1.1)
MCH RBC QN AUTO: 32.1 PG (ref 26–34)
MCHC RBC AUTO-ENTMCNC: 34.2 G/DL (ref 32–36)
MCV RBC AUTO: 94 FL (ref 80–100)
NRBC BLD-RTO: 0 /100 WBCS (ref 0–0)
OXYHGB MFR BLDCOV: 35.2 % (ref 94–98)
PCO2 BLDCOV: 51 MM HG (ref 22–53)
PH BLDCOV: 7.31 PH (ref 7.19–7.47)
PLATELET # BLD AUTO: 218 X10*3/UL (ref 150–450)
PMV BLD AUTO: 9.9 FL (ref 7.5–11.5)
PO2 BLDCOV: 27 MM HG (ref 13–37)
POTASSIUM SERPL-SCNC: 3.9 MMOL/L (ref 3.5–5.3)
PROT SERPL-MCNC: 6.8 G/DL (ref 6.4–8.2)
PROTHROMBIN TIME: 9.9 SECONDS (ref 9.8–12.8)
RBC # BLD AUTO: 3.99 X10*6/UL (ref 4–5.2)
RH FACTOR (ANTIGEN D): NORMAL
SAO2 % BLDCOV: 36 % (ref 16–84)
SODIUM SERPL-SCNC: 137 MMOL/L (ref 136–145)
WBC # BLD AUTO: 11.3 X10*3/UL (ref 4.4–11.3)

## 2023-10-26 PROCEDURE — 59515 CESAREAN DELIVERY: CPT | Performed by: OBSTETRICS & GYNECOLOGY

## 2023-10-26 PROCEDURE — 2500000001 HC RX 250 WO HCPCS SELF ADMINISTERED DRUGS (ALT 637 FOR MEDICARE OP): Performed by: STUDENT IN AN ORGANIZED HEALTH CARE EDUCATION/TRAINING PROGRAM

## 2023-10-26 PROCEDURE — 2500000005 HC RX 250 GENERAL PHARMACY W/O HCPCS

## 2023-10-26 PROCEDURE — 85384 FIBRINOGEN ACTIVITY: CPT | Performed by: STUDENT IN AN ORGANIZED HEALTH CARE EDUCATION/TRAINING PROGRAM

## 2023-10-26 PROCEDURE — 7100000016 HC LABOR RECOVERY PER HOUR: Performed by: OBSTETRICS & GYNECOLOGY

## 2023-10-26 PROCEDURE — 1100000001 HC PRIVATE ROOM DAILY

## 2023-10-26 PROCEDURE — 85027 COMPLETE CBC AUTOMATED: CPT | Performed by: STUDENT IN AN ORGANIZED HEALTH CARE EDUCATION/TRAINING PROGRAM

## 2023-10-26 PROCEDURE — 2500000004 HC RX 250 GENERAL PHARMACY W/ HCPCS (ALT 636 FOR OP/ED): Performed by: STUDENT IN AN ORGANIZED HEALTH CARE EDUCATION/TRAINING PROGRAM

## 2023-10-26 PROCEDURE — 2500000001 HC RX 250 WO HCPCS SELF ADMINISTERED DRUGS (ALT 637 FOR MEDICARE OP)

## 2023-10-26 PROCEDURE — 59871 REMOVE CERCLAGE SUTURE: CPT | Performed by: OBSTETRICS & GYNECOLOGY

## 2023-10-26 PROCEDURE — 88307 TISSUE EXAM BY PATHOLOGIST: CPT | Performed by: PATHOLOGY

## 2023-10-26 PROCEDURE — 01961 ANES CESAREAN DELIVERY ONLY: CPT

## 2023-10-26 PROCEDURE — 82805 BLOOD GASES W/O2 SATURATION: CPT | Performed by: STUDENT IN AN ORGANIZED HEALTH CARE EDUCATION/TRAINING PROGRAM

## 2023-10-26 PROCEDURE — 36415 COLL VENOUS BLD VENIPUNCTURE: CPT | Performed by: STUDENT IN AN ORGANIZED HEALTH CARE EDUCATION/TRAINING PROGRAM

## 2023-10-26 PROCEDURE — 0UCC7ZZ EXTIRPATION OF MATTER FROM CERVIX, VIA NATURAL OR ARTIFICIAL OPENING: ICD-10-PCS | Performed by: OBSTETRICS & GYNECOLOGY

## 2023-10-26 PROCEDURE — 86850 RBC ANTIBODY SCREEN: CPT | Performed by: STUDENT IN AN ORGANIZED HEALTH CARE EDUCATION/TRAINING PROGRAM

## 2023-10-26 PROCEDURE — 7210000002 HC LABOR PER HOUR

## 2023-10-26 PROCEDURE — 86900 BLOOD TYPING SEROLOGIC ABO: CPT | Performed by: STUDENT IN AN ORGANIZED HEALTH CARE EDUCATION/TRAINING PROGRAM

## 2023-10-26 PROCEDURE — 3700000014 HC AN EPIDURAL BLOCK CHARGE: Performed by: OBSTETRICS & GYNECOLOGY

## 2023-10-26 PROCEDURE — 80053 COMPREHEN METABOLIC PANEL: CPT | Performed by: STUDENT IN AN ORGANIZED HEALTH CARE EDUCATION/TRAINING PROGRAM

## 2023-10-26 PROCEDURE — 85730 THROMBOPLASTIN TIME PARTIAL: CPT | Performed by: STUDENT IN AN ORGANIZED HEALTH CARE EDUCATION/TRAINING PROGRAM

## 2023-10-26 PROCEDURE — 88307 TISSUE EXAM BY PATHOLOGIST: CPT | Mod: TC,SUR | Performed by: ADVANCED PRACTICE MIDWIFE

## 2023-10-26 PROCEDURE — 01961 ANES CESAREAN DELIVERY ONLY: CPT | Performed by: ANESTHESIOLOGY

## 2023-10-26 PROCEDURE — 59514 CESAREAN DELIVERY ONLY: CPT | Performed by: OBSTETRICS & GYNECOLOGY

## 2023-10-26 PROCEDURE — S0077 INJECTION, CLINDAMYCIN PHOSP: HCPCS

## 2023-10-26 PROCEDURE — 2500000004 HC RX 250 GENERAL PHARMACY W/ HCPCS (ALT 636 FOR OP/ED)

## 2023-10-26 PROCEDURE — 7100000016 HC LABOR RECOVERY PER HOUR

## 2023-10-26 PROCEDURE — 3700000018 HC OB ANESTHESIA C-SECTION: Performed by: OBSTETRICS & GYNECOLOGY

## 2023-10-26 PROCEDURE — 59050 FETAL MONITOR W/REPORT: CPT

## 2023-10-26 RX ORDER — GENTAMICIN 40 MG/ML
INJECTION, SOLUTION INTRAMUSCULAR; INTRAVENOUS AS NEEDED
Status: DISCONTINUED | OUTPATIENT
Start: 2023-10-26 | End: 2023-10-26

## 2023-10-26 RX ORDER — MORPHINE SULFATE 0.5 MG/ML
INJECTION, SOLUTION EPIDURAL; INTRATHECAL; INTRAVENOUS AS NEEDED
Status: DISCONTINUED | OUTPATIENT
Start: 2023-10-26 | End: 2023-10-26

## 2023-10-26 RX ORDER — METOCLOPRAMIDE HYDROCHLORIDE 5 MG/ML
10 INJECTION INTRAMUSCULAR; INTRAVENOUS EVERY 6 HOURS PRN
Status: DISCONTINUED | OUTPATIENT
Start: 2023-10-26 | End: 2023-10-26

## 2023-10-26 RX ORDER — MISOPROSTOL 200 UG/1
800 TABLET ORAL ONCE AS NEEDED
Status: DISCONTINUED | OUTPATIENT
Start: 2023-10-26 | End: 2023-10-26

## 2023-10-26 RX ORDER — LOPERAMIDE HYDROCHLORIDE 2 MG/1
4 CAPSULE ORAL EVERY 2 HOUR PRN
Status: DISCONTINUED | OUTPATIENT
Start: 2023-10-26 | End: 2023-10-26

## 2023-10-26 RX ORDER — FAMOTIDINE 10 MG/ML
INJECTION INTRAVENOUS AS NEEDED
Status: DISCONTINUED | OUTPATIENT
Start: 2023-10-26 | End: 2023-10-26

## 2023-10-26 RX ORDER — IBUPROFEN 600 MG/1
600 TABLET ORAL EVERY 6 HOURS
Status: DISCONTINUED | OUTPATIENT
Start: 2023-10-27 | End: 2023-10-28 | Stop reason: HOSPADM

## 2023-10-26 RX ORDER — OXYTOCIN 10 [USP'U]/ML
10 INJECTION, SOLUTION INTRAMUSCULAR; INTRAVENOUS ONCE AS NEEDED
Status: DISCONTINUED | OUTPATIENT
Start: 2023-10-26 | End: 2023-10-28 | Stop reason: HOSPADM

## 2023-10-26 RX ORDER — NIFEDIPINE 10 MG/1
10 CAPSULE ORAL ONCE AS NEEDED
Status: DISCONTINUED | OUTPATIENT
Start: 2023-10-26 | End: 2023-10-26

## 2023-10-26 RX ORDER — HYDRALAZINE HYDROCHLORIDE 20 MG/ML
5 INJECTION INTRAMUSCULAR; INTRAVENOUS ONCE AS NEEDED
Status: DISCONTINUED | OUTPATIENT
Start: 2023-10-26 | End: 2023-10-26

## 2023-10-26 RX ORDER — ONDANSETRON HYDROCHLORIDE 2 MG/ML
INJECTION, SOLUTION INTRAVENOUS AS NEEDED
Status: DISCONTINUED | OUTPATIENT
Start: 2023-10-26 | End: 2023-10-26

## 2023-10-26 RX ORDER — METHYLERGONOVINE MALEATE 0.2 MG/ML
0.2 INJECTION INTRAVENOUS ONCE AS NEEDED
Status: DISCONTINUED | OUTPATIENT
Start: 2023-10-26 | End: 2023-10-28 | Stop reason: HOSPADM

## 2023-10-26 RX ORDER — HYDRALAZINE HYDROCHLORIDE 20 MG/ML
5 INJECTION INTRAMUSCULAR; INTRAVENOUS ONCE AS NEEDED
Status: DISCONTINUED | OUTPATIENT
Start: 2023-10-26 | End: 2023-10-28 | Stop reason: HOSPADM

## 2023-10-26 RX ORDER — HYDROMORPHONE HYDROCHLORIDE 1 MG/ML
0.2 INJECTION, SOLUTION INTRAMUSCULAR; INTRAVENOUS; SUBCUTANEOUS EVERY 5 MIN PRN
Status: DISCONTINUED | OUTPATIENT
Start: 2023-10-26 | End: 2023-10-28 | Stop reason: HOSPADM

## 2023-10-26 RX ORDER — NIFEDIPINE 10 MG/1
10 CAPSULE ORAL ONCE AS NEEDED
Status: DISCONTINUED | OUTPATIENT
Start: 2023-10-26 | End: 2023-10-28 | Stop reason: HOSPADM

## 2023-10-26 RX ORDER — CARBOPROST TROMETHAMINE 250 UG/ML
250 INJECTION, SOLUTION INTRAMUSCULAR ONCE AS NEEDED
Status: DISCONTINUED | OUTPATIENT
Start: 2023-10-26 | End: 2023-10-28 | Stop reason: HOSPADM

## 2023-10-26 RX ORDER — DIPHENHYDRAMINE HYDROCHLORIDE 50 MG/ML
25 INJECTION INTRAMUSCULAR; INTRAVENOUS EVERY 4 HOURS PRN
Status: DISCONTINUED | OUTPATIENT
Start: 2023-10-26 | End: 2023-10-28 | Stop reason: HOSPADM

## 2023-10-26 RX ORDER — ACETAMINOPHEN 120 MG/1
SUPPOSITORY RECTAL AS NEEDED
Status: DISCONTINUED | OUTPATIENT
Start: 2023-10-26 | End: 2023-10-26

## 2023-10-26 RX ORDER — CARBOPROST TROMETHAMINE 250 UG/ML
250 INJECTION, SOLUTION INTRAMUSCULAR ONCE AS NEEDED
Status: DISCONTINUED | OUTPATIENT
Start: 2023-10-26 | End: 2023-10-26

## 2023-10-26 RX ORDER — DIPHENHYDRAMINE HCL 25 MG
25 CAPSULE ORAL EVERY 4 HOURS PRN
Status: DISCONTINUED | OUTPATIENT
Start: 2023-10-26 | End: 2023-10-28 | Stop reason: HOSPADM

## 2023-10-26 RX ORDER — NALOXONE HYDROCHLORIDE 0.4 MG/ML
0.1 INJECTION, SOLUTION INTRAMUSCULAR; INTRAVENOUS; SUBCUTANEOUS EVERY 5 MIN PRN
Status: DISCONTINUED | OUTPATIENT
Start: 2023-10-26 | End: 2023-10-28 | Stop reason: HOSPADM

## 2023-10-26 RX ORDER — TRANEXAMIC ACID 100 MG/ML
1000 INJECTION, SOLUTION INTRAVENOUS ONCE AS NEEDED
Status: DISCONTINUED | OUTPATIENT
Start: 2023-10-26 | End: 2023-10-26

## 2023-10-26 RX ORDER — CLONAZEPAM 0.5 MG/1
0.5 TABLET ORAL DAILY PRN
Status: DISCONTINUED | OUTPATIENT
Start: 2023-10-26 | End: 2023-10-27

## 2023-10-26 RX ORDER — LOPERAMIDE HYDROCHLORIDE 2 MG/1
4 CAPSULE ORAL EVERY 2 HOUR PRN
Status: DISCONTINUED | OUTPATIENT
Start: 2023-10-26 | End: 2023-10-28 | Stop reason: HOSPADM

## 2023-10-26 RX ORDER — PHENYLEPHRINE HCL IN 0.9% NACL 0.4MG/10ML
SYRINGE (ML) INTRAVENOUS AS NEEDED
Status: DISCONTINUED | OUTPATIENT
Start: 2023-10-26 | End: 2023-10-26

## 2023-10-26 RX ORDER — METOCLOPRAMIDE 10 MG/1
10 TABLET ORAL 2 TIMES DAILY PRN
Status: DISCONTINUED | OUTPATIENT
Start: 2023-10-26 | End: 2023-10-26

## 2023-10-26 RX ORDER — MISOPROSTOL 200 UG/1
800 TABLET ORAL ONCE AS NEEDED
Status: DISCONTINUED | OUTPATIENT
Start: 2023-10-26 | End: 2023-10-28 | Stop reason: HOSPADM

## 2023-10-26 RX ORDER — KETOROLAC TROMETHAMINE 30 MG/ML
30 INJECTION, SOLUTION INTRAMUSCULAR; INTRAVENOUS EVERY 6 HOURS
Status: COMPLETED | OUTPATIENT
Start: 2023-10-26 | End: 2023-10-27

## 2023-10-26 RX ORDER — ACETAMINOPHEN 325 MG/1
975 TABLET ORAL EVERY 6 HOURS
Status: DISCONTINUED | OUTPATIENT
Start: 2023-10-26 | End: 2023-10-28 | Stop reason: HOSPADM

## 2023-10-26 RX ORDER — OXYCODONE HYDROCHLORIDE 5 MG/1
10 TABLET ORAL EVERY 4 HOURS PRN
Status: DISCONTINUED | OUTPATIENT
Start: 2023-10-27 | End: 2023-10-28 | Stop reason: HOSPADM

## 2023-10-26 RX ORDER — LABETALOL HYDROCHLORIDE 5 MG/ML
20 INJECTION, SOLUTION INTRAVENOUS ONCE AS NEEDED
Status: DISCONTINUED | OUTPATIENT
Start: 2023-10-26 | End: 2023-10-28 | Stop reason: HOSPADM

## 2023-10-26 RX ORDER — ONDANSETRON 4 MG/1
4 TABLET, FILM COATED ORAL EVERY 6 HOURS PRN
Status: DISCONTINUED | OUTPATIENT
Start: 2023-10-26 | End: 2023-10-28 | Stop reason: HOSPADM

## 2023-10-26 RX ORDER — ONDANSETRON HYDROCHLORIDE 2 MG/ML
4 INJECTION, SOLUTION INTRAVENOUS EVERY 6 HOURS PRN
Status: DISCONTINUED | OUTPATIENT
Start: 2023-10-26 | End: 2023-10-26

## 2023-10-26 RX ORDER — BUPIVACAINE HYDROCHLORIDE 7.5 MG/ML
INJECTION, SOLUTION INTRASPINAL AS NEEDED
Status: DISCONTINUED | OUTPATIENT
Start: 2023-10-26 | End: 2023-10-26

## 2023-10-26 RX ORDER — METOCLOPRAMIDE 10 MG/1
10 TABLET ORAL EVERY 6 HOURS PRN
Status: DISCONTINUED | OUTPATIENT
Start: 2023-10-26 | End: 2023-10-26

## 2023-10-26 RX ORDER — BISACODYL 10 MG/1
10 SUPPOSITORY RECTAL DAILY PRN
Status: DISCONTINUED | OUTPATIENT
Start: 2023-10-26 | End: 2023-10-28 | Stop reason: HOSPADM

## 2023-10-26 RX ORDER — PHENYLEPHRINE 10 MG/250 ML(40 MCG/ML)IN 0.9 % SOD.CHLORIDE INTRAVENOUS
CONTINUOUS PRN
Status: DISCONTINUED | OUTPATIENT
Start: 2023-10-26 | End: 2023-10-26

## 2023-10-26 RX ORDER — TERBUTALINE SULFATE 1 MG/ML
0.25 INJECTION SUBCUTANEOUS ONCE AS NEEDED
Status: DISCONTINUED | OUTPATIENT
Start: 2023-10-26 | End: 2023-10-26

## 2023-10-26 RX ORDER — OXYTOCIN 10 [USP'U]/ML
10 INJECTION, SOLUTION INTRAMUSCULAR; INTRAVENOUS ONCE AS NEEDED
Status: DISCONTINUED | OUTPATIENT
Start: 2023-10-26 | End: 2023-10-26

## 2023-10-26 RX ORDER — OXYCODONE HYDROCHLORIDE 5 MG/1
5 TABLET ORAL EVERY 4 HOURS PRN
Status: DISCONTINUED | OUTPATIENT
Start: 2023-10-27 | End: 2023-10-28 | Stop reason: HOSPADM

## 2023-10-26 RX ORDER — KETOROLAC TROMETHAMINE 30 MG/ML
INJECTION, SOLUTION INTRAMUSCULAR; INTRAVENOUS AS NEEDED
Status: DISCONTINUED | OUTPATIENT
Start: 2023-10-26 | End: 2023-10-26

## 2023-10-26 RX ORDER — CLINDAMYCIN PHOSPHATE 150 MG/ML
INJECTION, SOLUTION INTRAVENOUS AS NEEDED
Status: DISCONTINUED | OUTPATIENT
Start: 2023-10-26 | End: 2023-10-26

## 2023-10-26 RX ORDER — OXYTOCIN/0.9 % SODIUM CHLORIDE 30/500 ML
60 PLASTIC BAG, INJECTION (ML) INTRAVENOUS ONCE AS NEEDED
Status: DISCONTINUED | OUTPATIENT
Start: 2023-10-26 | End: 2023-10-26

## 2023-10-26 RX ORDER — TRANEXAMIC ACID 100 MG/ML
1000 INJECTION, SOLUTION INTRAVENOUS ONCE AS NEEDED
Status: DISCONTINUED | OUTPATIENT
Start: 2023-10-26 | End: 2023-10-28 | Stop reason: HOSPADM

## 2023-10-26 RX ORDER — METHYLERGONOVINE MALEATE 0.2 MG/ML
0.2 INJECTION INTRAVENOUS ONCE AS NEEDED
Status: DISCONTINUED | OUTPATIENT
Start: 2023-10-26 | End: 2023-10-26

## 2023-10-26 RX ORDER — POLYETHYLENE GLYCOL 3350 17 G/17G
17 POWDER, FOR SOLUTION ORAL 2 TIMES DAILY PRN
Status: DISCONTINUED | OUTPATIENT
Start: 2023-10-26 | End: 2023-10-28 | Stop reason: HOSPADM

## 2023-10-26 RX ORDER — LABETALOL HYDROCHLORIDE 5 MG/ML
20 INJECTION, SOLUTION INTRAVENOUS ONCE AS NEEDED
Status: DISCONTINUED | OUTPATIENT
Start: 2023-10-26 | End: 2023-10-26

## 2023-10-26 RX ORDER — METOCLOPRAMIDE HYDROCHLORIDE 5 MG/ML
10 INJECTION INTRAMUSCULAR; INTRAVENOUS EVERY 6 HOURS PRN
Status: DISCONTINUED | OUTPATIENT
Start: 2023-10-26 | End: 2023-10-28 | Stop reason: HOSPADM

## 2023-10-26 RX ORDER — LIDOCAINE HYDROCHLORIDE 10 MG/ML
0.5 INJECTION INFILTRATION; PERINEURAL ONCE AS NEEDED
Status: DISCONTINUED | OUTPATIENT
Start: 2023-10-26 | End: 2023-10-26

## 2023-10-26 RX ORDER — FENTANYL CITRATE 50 UG/ML
INJECTION, SOLUTION INTRAMUSCULAR; INTRAVENOUS AS NEEDED
Status: DISCONTINUED | OUTPATIENT
Start: 2023-10-26 | End: 2023-10-26

## 2023-10-26 RX ORDER — SIMETHICONE 80 MG
80 TABLET,CHEWABLE ORAL 4 TIMES DAILY PRN
Status: DISCONTINUED | OUTPATIENT
Start: 2023-10-26 | End: 2023-10-28 | Stop reason: HOSPADM

## 2023-10-26 RX ORDER — LIDOCAINE 560 MG/1
1 PATCH PERCUTANEOUS; TOPICAL; TRANSDERMAL
Status: DISCONTINUED | OUTPATIENT
Start: 2023-10-26 | End: 2023-10-28 | Stop reason: HOSPADM

## 2023-10-26 RX ORDER — NALBUPHINE HYDROCHLORIDE 10 MG/ML
5 INJECTION, SOLUTION INTRAMUSCULAR; INTRAVENOUS; SUBCUTANEOUS
Status: DISPENSED | OUTPATIENT
Start: 2023-10-26 | End: 2023-10-27

## 2023-10-26 RX ORDER — OXYTOCIN/0.9 % SODIUM CHLORIDE 30/500 ML
60 PLASTIC BAG, INJECTION (ML) INTRAVENOUS ONCE AS NEEDED
Status: DISCONTINUED | OUTPATIENT
Start: 2023-10-26 | End: 2023-10-28 | Stop reason: HOSPADM

## 2023-10-26 RX ORDER — LIDOCAINE HYDROCHLORIDE 10 MG/ML
30 INJECTION INFILTRATION; PERINEURAL ONCE AS NEEDED
Status: DISCONTINUED | OUTPATIENT
Start: 2023-10-26 | End: 2023-10-26

## 2023-10-26 RX ORDER — ONDANSETRON 4 MG/1
4 TABLET, FILM COATED ORAL EVERY 6 HOURS PRN
Status: DISCONTINUED | OUTPATIENT
Start: 2023-10-26 | End: 2023-10-26

## 2023-10-26 RX ORDER — ONDANSETRON HYDROCHLORIDE 2 MG/ML
4 INJECTION, SOLUTION INTRAVENOUS EVERY 6 HOURS PRN
Status: DISCONTINUED | OUTPATIENT
Start: 2023-10-26 | End: 2023-10-28 | Stop reason: HOSPADM

## 2023-10-26 RX ORDER — DIPHENHYDRAMINE HYDROCHLORIDE 50 MG/ML
25 INJECTION INTRAMUSCULAR; INTRAVENOUS ONCE
Status: DISCONTINUED | OUTPATIENT
Start: 2023-10-26 | End: 2023-10-28 | Stop reason: HOSPADM

## 2023-10-26 RX ORDER — ADHESIVE BANDAGE
10 BANDAGE TOPICAL
Status: DISCONTINUED | OUTPATIENT
Start: 2023-10-26 | End: 2023-10-28 | Stop reason: HOSPADM

## 2023-10-26 RX ORDER — SODIUM CHLORIDE, SODIUM LACTATE, POTASSIUM CHLORIDE, CALCIUM CHLORIDE 600; 310; 30; 20 MG/100ML; MG/100ML; MG/100ML; MG/100ML
125 INJECTION, SOLUTION INTRAVENOUS CONTINUOUS
Status: DISCONTINUED | OUTPATIENT
Start: 2023-10-26 | End: 2023-10-26

## 2023-10-26 RX ADMIN — ONDANSETRON 4 MG: 2 INJECTION INTRAMUSCULAR; INTRAVENOUS at 13:04

## 2023-10-26 RX ADMIN — ACETAMINOPHEN 650 MG: 120 SUPPOSITORY RECTAL at 14:24

## 2023-10-26 RX ADMIN — KETOROLAC TROMETHAMINE 30 MG: 30 INJECTION, SOLUTION INTRAMUSCULAR; INTRAVENOUS at 19:50

## 2023-10-26 RX ADMIN — DIPHENHYDRAMINE HYDROCHLORIDE 25 MG: 25 CAPSULE ORAL at 15:12

## 2023-10-26 RX ADMIN — FAMOTIDINE 20 MG: 10 INJECTION INTRAVENOUS at 13:04

## 2023-10-26 RX ADMIN — FENTANYL CITRATE 100 MCG: 50 INJECTION, SOLUTION INTRAMUSCULAR; INTRAVENOUS at 13:39

## 2023-10-26 RX ADMIN — ONDANSETRON 4 MG: 2 INJECTION INTRAMUSCULAR; INTRAVENOUS at 18:56

## 2023-10-26 RX ADMIN — BUPIVACAINE HYDROCHLORIDE IN DEXTROSE 1.6 ML: 7.5 INJECTION, SOLUTION SUBARACHNOID at 13:20

## 2023-10-26 RX ADMIN — DEXMEDETOMIDINE HYDROCHLORIDE 12 MCG: 100 INJECTION, SOLUTION INTRAVENOUS at 13:48

## 2023-10-26 RX ADMIN — Medication 200 MCG: at 13:55

## 2023-10-26 RX ADMIN — Medication 0.79 MCG/KG/MIN: at 13:20

## 2023-10-26 RX ADMIN — MORPHINE SULFATE 2.5 MG: 0.5 INJECTION EPIDURAL; INTRATHECAL; INTRAVENOUS at 13:51

## 2023-10-26 RX ADMIN — DEXMEDETOMIDINE HYDROCHLORIDE 12 MCG: 100 INJECTION, SOLUTION INTRAVENOUS at 13:11

## 2023-10-26 RX ADMIN — Medication 200 MCG: at 13:57

## 2023-10-26 RX ADMIN — SODIUM CHLORIDE, SODIUM LACTATE, POTASSIUM CHLORIDE, AND CALCIUM CHLORIDE: 600; 310; 30; 20 INJECTION, SOLUTION INTRAVENOUS at 13:27

## 2023-10-26 RX ADMIN — CLONAZEPAM 0.5 MG: 0.5 TABLET ORAL at 23:40

## 2023-10-26 RX ADMIN — DEXAMETHASONE SODIUM PHOSPHATE 4 MG: 4 INJECTION, SOLUTION INTRAMUSCULAR; INTRAVENOUS at 13:28

## 2023-10-26 RX ADMIN — KETOROLAC TROMETHAMINE 30 MG: 30 INJECTION, SOLUTION INTRAMUSCULAR; INTRAVENOUS at 14:20

## 2023-10-26 RX ADMIN — CLINDAMYCIN PHOSPHATE 900 MG: 150 INJECTION, SOLUTION INTRAMUSCULAR; INTRAVENOUS at 13:36

## 2023-10-26 RX ADMIN — GENTAMICIN SULFATE 320 MG: 40 INJECTION, SOLUTION INTRAMUSCULAR; INTRAVENOUS at 13:36

## 2023-10-26 RX ADMIN — OXYTOCIN 600 MILLI-UNITS/MIN: 10 INJECTION, SOLUTION INTRAMUSCULAR; INTRAVENOUS at 13:45

## 2023-10-26 RX ADMIN — DEXMEDETOMIDINE HYDROCHLORIDE 8 MCG: 100 INJECTION, SOLUTION INTRAVENOUS at 13:07

## 2023-10-26 SDOH — HEALTH STABILITY: MENTAL HEALTH: CURRENT SMOKER: 0

## 2023-10-26 SDOH — SOCIAL STABILITY: SOCIAL INSECURITY: VERBAL ABUSE: DENIES

## 2023-10-26 SDOH — HEALTH STABILITY: MENTAL HEALTH: SUICIDAL BEHAVIOR (LIFETIME): NO

## 2023-10-26 SDOH — ECONOMIC STABILITY: HOUSING INSECURITY: DO YOU FEEL UNSAFE GOING BACK TO THE PLACE WHERE YOU ARE LIVING?: NO

## 2023-10-26 SDOH — SOCIAL STABILITY: SOCIAL INSECURITY: ARE YOU OR HAVE YOU BEEN THREATENED OR ABUSED PHYSICALLY, EMOTIONALLY, OR SEXUALLY BY ANYONE?: NO

## 2023-10-26 SDOH — HEALTH STABILITY: MENTAL HEALTH: NON-SPECIFIC ACTIVE SUICIDAL THOUGHTS (PAST 1 MONTH): NO

## 2023-10-26 SDOH — SOCIAL STABILITY: SOCIAL INSECURITY: DOES ANYONE TRY TO KEEP YOU FROM HAVING/CONTACTING OTHER FRIENDS OR DOING THINGS OUTSIDE YOUR HOME?: NO

## 2023-10-26 SDOH — SOCIAL STABILITY: SOCIAL INSECURITY: PHYSICAL ABUSE: DENIES

## 2023-10-26 SDOH — HEALTH STABILITY: MENTAL HEALTH: WERE YOU ABLE TO COMPLETE ALL THE BEHAVIORAL HEALTH SCREENINGS?: YES

## 2023-10-26 SDOH — SOCIAL STABILITY: SOCIAL INSECURITY: DO YOU FEEL ANYONE HAS EXPLOITED OR TAKEN ADVANTAGE OF YOU FINANCIALLY OR OF YOUR PERSONAL PROPERTY?: NO

## 2023-10-26 SDOH — HEALTH STABILITY: MENTAL HEALTH: WISH TO BE DEAD (PAST 1 MONTH): NO

## 2023-10-26 SDOH — SOCIAL STABILITY: SOCIAL INSECURITY: ARE THERE ANY APPARENT SIGNS OF INJURIES/BEHAVIORS THAT COULD BE RELATED TO ABUSE/NEGLECT?: NO

## 2023-10-26 SDOH — SOCIAL STABILITY: SOCIAL INSECURITY: HAVE YOU HAD THOUGHTS OF HARMING ANYONE ELSE?: NO

## 2023-10-26 SDOH — SOCIAL STABILITY: SOCIAL INSECURITY: HAS ANYONE EVER THREATENED TO HURT YOUR FAMILY OR YOUR PETS?: NO

## 2023-10-26 SDOH — SOCIAL STABILITY: SOCIAL INSECURITY: ABUSE SCREEN: ADULT

## 2023-10-26 ASSESSMENT — LIFESTYLE VARIABLES
HOW MANY STANDARD DRINKS CONTAINING ALCOHOL DO YOU HAVE ON A TYPICAL DAY: PATIENT DOES NOT DRINK
HOW OFTEN DO YOU HAVE A DRINK CONTAINING ALCOHOL: NEVER
AUDIT-C TOTAL SCORE: 0
HOW OFTEN DO YOU HAVE 6 OR MORE DRINKS ON ONE OCCASION: NEVER
AUDIT-C TOTAL SCORE: 0
SKIP TO QUESTIONS 9-10: 1

## 2023-10-26 ASSESSMENT — PAIN SCALES - GENERAL
PAINLEVEL: 0 - NO PAIN
PAINLEVEL_OUTOF10: 0 - NO PAIN
PAINLEVEL_OUTOF10: 3
PAINLEVEL_OUTOF10: 0 - NO PAIN
PAINLEVEL_OUTOF10: 2
PAINLEVEL_OUTOF10: 0 - NO PAIN
PAIN_LEVEL: 0
PAINLEVEL_OUTOF10: 0 - NO PAIN
PAINLEVEL_OUTOF10: 0 - NO PAIN

## 2023-10-26 ASSESSMENT — PAIN DESCRIPTION - DESCRIPTORS
DESCRIPTORS: ACHING;DULL
DESCRIPTORS: SORE;ACHING

## 2023-10-26 NOTE — ANESTHESIA PROCEDURE NOTES
CSE Block    Patient location during procedure: OR  Start time: 10/26/2023 1:31 PM  End time: 10/26/2023 1:30 PM  Reason for block: primary anesthetic}  Staffing  Performed: CRNA   Authorized by: ANDI Alvarado    Performed by: ANDI Ortiz    Preanesthetic Checklist  Completed: patient identified, IV checked, risks and benefits discussed, surgical consent, monitors and equipment checked, pre-op evaluation, timeout performed and sterile techniques followed  Block Timeout  RN/Licensed healthcare professional reads aloud to the Anesthesia provider and entire team: Patient identity, procedure with side and site, patient position, and as applicable the availability of implants/special equipment/special requirements.  Patient on coagulant treatment: no  Timeout performed at: 10/26/2023 1:14 PM    CSE Block  Patient position: sitting  Prep: ChloraPrep  Sterility prep: cap, drape, gloves, mask and hand  Sedation level: light sedation  Patient monitoring: blood pressure, continuous pulse oximetry and heart rate  Approach: midline  Local numbing: lidocaine 1% to skin and subcutaneous tissues  Vertebral space: lumbar  L3-4  Guidance: landmark technique    Epidural Needle  ADRIAN technique: saline  Needle type: Tuohy   Needle gauge: 17 G  Needle length: 8.9 cm  Needle insertion depth: 3.5 cm  Spinal Needle  Needle type: pencil-point   Needle gauge: 25 G  Needle length: 12.7 cm  Free flow CSF: yes  Epidural Catheter  Catheter type: multi-orifice  Catheter size: 19 G  Catheter securement method: clear occlusive dressing and liquid medical adhesive    Test dose: Other (see comment)            Assessment  Sensory level: T4 bilateral  Block outcome: Allis test negative  Number of attempts: 1  Procedure assessment: patient tolerated procedure well with no immediate complications  Additional Notes  Cse performed by tiago garcía crna

## 2023-10-26 NOTE — H&P
Obstetrical Admission History and Physical    Obstetrical Admission History and Physical     Lexi Licona is a 33 y.o.  at 34w1d. SONYA: 2023, by Ultrasound. Estimated fetal weight: 2200g. She has had prenatal care with [facility].    Chief Complaint: Vaginal Bleeding (Since 1035 VB)    Assessment/Plan    Vaginal Bleeding in the s/o Placenta previa in third trimester    -Ongoing VB since 1030AM: CBC, Coag, Fibrinogen ordered on admission  -Velamentous cord insertion vs marginal cord insertion on US 10/17  -Previa Still present on BSUS today, cephalic presentation  -Originally sched for pCS in @ 36wga will plan to move toward urgent delivery given VB.    Hx of prior PTL/PTD  -History indicated cerclage in place  -Plan to remove after delivery    Maternal Wellbeing  -CBC &T&S ordered on admission  -T&C 2u pRBC    GBS Negative  -No abx ppx    Fetal Wellbeing  -EFW 1972g on 10/17growth   -BMZ deferred given urgency for delivery     Postpartum Planning  PPBC: partner vasectomy    D/w Dr. Mclaughlin-Everardo Jones MD PGY-3  Principal Problem:    Pregnancy, antepartum, complications, third trimester  Active Problems:    Short cervix affecting pregnancy    Placenta previa in third trimester      Pregnancy Problems (from 04/10/23 to present)       Problem Noted Resolved    Pregnancy, antepartum, complications, third trimester 10/26/2023 by Taylor Johnson MD No    Priority:  Medium      Prior  labor, antepartum 10/7/2023 by Sandie Morgan MD No    Priority:  Medium      Overview Addendum 10/16/2023  8:45 PM by ZOIE Au     H/o 34 wga delivery  See short cervix dx          Short cervix affecting pregnancy 10/2/2023 by ZOIE Au No    Priority:  Medium      Overview Addendum 10/17/2023 10:46 AM by ZOIE Au       - short cervix seen at 16 weeks, s/p cerclage placement   - initially desired elective steroids at 34w, reviewed today and pt feels more confident  with delivery at 36 wks, thoroughly counseled. Does not desire steroids now  - will plan for first dose here in office and partner will give second one at home   -Plan for cerclage removal at the time of CD           Placenta previa in third trimester 10/2/2023 by ZOIE Au No    Priority:  Medium      Overview Addendum 10/17/2023 10:46 AM by ZOIE Au     -Velamentous cord insertion vs marginal cord insertion on US today- Still present, re-reviewed finding of persistent previa on US as well as finding on US of anterior PCI and risk of developing vasa previa. Reviewed implications of persistent placenta previa as well as vasa previa and impact on delivery timing and method.   - Discussed if remains a previa and no bleeding plan for 36-37w. Discussed likely plan for C/D on  with Dr. Kaplan         Supervision of high risk pregnancy in third trimester 10/2/2023 by ZOIE Au No    Priority:  Medium      Overview Addendum 10/23/2023  9:32 PM by ZOIE Au     - PNB completed. Reviewed lab results. Normal. 1hr gtt 104. Hgb WNL.   - last pap ASCUS pos [facility], s/p normal colpo. Due in , will get PP   - s/p tdap vaccine  -Growth AGA- see report for details    -GBS completed in triage on 10/7-negative   - PNV weekly until delivery   -CD scheduled on  with Dr. Kaplan          Anxiety in pregnancy in third trimester, antepartum 10/3/2023 by ZOIE Au 10/7/2023 by Sandie Morgan MD          Options for delivery have been discussed with the patient and she elects for a primary  section.    The surgery has been discussed in detail. The risks, benefits, complications, alternatives, expected outcomes, potential problems during recuperation and recovery, and the risks of not performing the procedure were discussed with the patient. The patient stated understanding that the risks of a  section include, but are not limited to: death;  reaction to medications; injury to bowel, bladder, ureters, uterus, cervix, vagina, and other pelvic and abdominal structures, infection; blood loss and possible need for transfusion; and potential need for more surgery, including hysterectomy. The risks of injury to the infant during  section were also discussed. The possible need for a  section in the future was explained. All questions were answered. There was concurrence with the planned procedure, and the patient wanted to proceed.    Admit to inpatient status. I anticipate that this patient will require a stay exceeding at least 2 midnights for delivery and postpartum.  Proceed with planned primary  section.  Management of pregnancy complications, as indicated.     Subjective   Patient notes VB that started at 10:30 Am today when she noticed blood in the toilet. Denied any acute abdominal pain or noticeable ctx and reporting Good fetal movement. Patient last seen in the office on 10/24 and BMZ deferred given no c/f vaginal bleeding at that time. Notes feeling anxious about delivery.     Indication for Primary  Section  placenta previa    Vaginal Bleeding in the s/o known Placenta previa     Obstetrical History   OB History    Para Term  AB Living   2 1 0 1 0 1   SAB IAB Ectopic Multiple Live Births   0 0 0 0 1      # Outcome Date GA Lbr Andrea/2nd Weight Sex Delivery Anes PTL Lv   2 Current            1  04/15/21 34w3d  2126 g F Vag-Spont  Y DIAMANTE       Past Medical History  Past Medical History:   Diagnosis Date    Abnormal Pap smear of cervix     Anxiety     Cervical high risk human papillomavirus (HPV) DNA test positive 2017    Cervical high risk HPV (human papillomavirus) test positive    Essential (primary) hypertension 2017    Chronic hypertension    Low grade squamous intraepithelial lesion on cytologic smear of cervix (LGSIL) 2017    Pap smear abnormality of cervix with LGSIL    Personal  "history of other mental and behavioral disorders 02/01/2017    History of attention deficit disorder    Placenta previa         Past Surgical History   Past Surgical History:   Procedure Laterality Date    COLPOSCOPY      OTHER SURGICAL HISTORY  02/01/2017    Oral Surgery Tooth Extraction [place] Tooth       Social History  Social History     Tobacco Use    Smoking status: Never     Passive exposure: Never    Smokeless tobacco: Never   Substance Use Topics    Alcohol use: Not Currently     Substance and Sexual Activity   Drug Use Not Currently       Allergies  Ceclor [cefaclor]     Medications  Medications Prior to Admission   Medication Sig Dispense Refill Last Dose    prenatal no115/iron/folic acid (PRENATAL 19 ORAL) Take by mouth.   10/25/2023       Objective    Last Vitals  Temp Pulse Resp BP MAP O2 Sat   36 °C (96.8 °F) 89 16 116/68   100 %     Physical Examination  GENERAL: Examination reveals a well developed, well nourished, gravid female in no acute distress. She is alert and cooperative.  HEENT: PERRLA. External ears normal. Nose normal, no erythema or discharge. Mouth and throat clear.  HEART: regular rate and rhythm, S1, S2 normal, no murmur, click, rub or gallop  ABDOMEN: soft, gravid, nontender, nondistended, no abnormal masses, no epigastric pain  FHR is  145, with Accelerations, and a Category I tracing.    CERVIX: Fingertip cm dilated, 40 % effaced, -3 station; MEMBRANES are Intact  EXTREMITIES: no redness or tenderness in the calves or thighs, no edema  NEUROLOGICAL: alert, oriented, normal speech, no focal findings or movement disorder noted  PSYCHOLOGICAL: awake and alert; oriented to person, place, and time and noticeable anxious    Lab Review  No results found for: \"ABO\", \"LABRH\", \"ABSCRN\"  No results found for: \"WBC\", \"HGB\", \"HCT\", \"PLT\"  No results found for: \"GRPBSTREP\"  No results found for: \"GLUCOSE\", \"NA\", \"K\", \"CL\", \"CO2\", \"ANIONGAP\", \"BUN\", \"CREATININE\", \"EGFR\", \"CALCIUM\", \"ALBUMIN\", " "\"PROT\", \"ALKPHOS\", \"ALT\", \"AST\", \"BILITOT\"  No results found for: \"APTT\", \"PROTIME\", \"INR\"  No results found for: \"FIBRINOGEN\"    "

## 2023-10-26 NOTE — TELEPHONE ENCOUNTER
Patient called and left a message on an administrative assistants line that she was having bleeding and going to the hospital. Attempted to call the patient, no answer. Left message to call the office back.

## 2023-10-26 NOTE — ANESTHESIA PREPROCEDURE EVALUATION
Patient: Lexi Licona    Evaluation Method: In-person visit    Procedure Information    Anesthesia Start Date/Time: 10/26/23 1305  Procedure: Labor Analgesia         Relevant Problems   No relevant active problems       Clinical information reviewed:    Allergies  Meds               NPO Detail:  NPO/Void Status  Date of Last Liquid: 10/26/23  Time of Last Liquid: 1030  Date of Last Solid: 10/26/23  Time of Last Solid: 1030         OB/Gyn Evaluation    Present Pregnancy    Patient is pregnant now (placenta previa).   Obstetric History                Physical Exam    Airway  Mallampati: III  TM distance: >3 FB  Neck ROM: full     Cardiovascular    Dental    Pulmonary    Abdominal            Anesthesia Plan    ASA 3     CSE     The patient is not a current smoker.    Anesthetic plan and risks discussed with patient and spouse.  Use of blood products discussed with patient and spouse who.    Plan discussed with attending and CRNA.

## 2023-10-26 NOTE — ANESTHESIA POSTPROCEDURE EVALUATION
Patient: Lexi Licona    Procedure Summary       Date: 10/26/23 Room / Location:     Anesthesia Start: 1305 Anesthesia Stop: 1435    Procedure: Labor Analgesia Diagnosis:     Scheduled Providers:  Responsible Provider: Temi Cole MD    Anesthesia Type: CSE ASA Status: 3            Anesthesia Type: CSE    Vitals Value Taken Time   BP 90/53 10/26/23 1433   Temp 36.2 10/26/23 1435   Pulse 75 10/26/23 1433   Resp 16 10/26/23 1435   SpO2 100 % 10/26/23 1432       Anesthesia Post Evaluation    Patient location during evaluation: bedside  Patient participation: complete - patient participated  Level of consciousness: awake and alert  Pain score: 0  Pain management: adequate  Airway patency: patent  Cardiovascular status: acceptable  Respiratory status: acceptable  Hydration status: acceptable        No notable events documented.

## 2023-10-26 NOTE — L&D DELIVERY NOTE
OB Delivery Note  10/26/2023  Lexi Licona  33 y.o.     Date: 10/26/2023  OR Location: [facility]    Name: Lexi Licona : 1990, Age: 33 y.o., MRN: [id number], Sex: female    Diagnosis  Pre-op Diagnosis     * Placenta previa in third trimester [O44.03]     * Short cervix affecting pregnancy [O26.879] Post-op Diagnosis     * Placenta previa in third trimester [O44.03]     * Short cervix affecting pregnancy [O26.879]     Procedures  [facility]  Section @ 36.1 wks - placenta previa - MFM pt  26478 - AK  DELIVERY ONLY W/POSTPARTUM CARE    AK REMOVAL CERCLAGE SUTURE UNDER ANESTHESIA [66180]  Surgeons      * Anahy Morillo    Resident/Fellow/Other Assistant:  Surgeon(s) and Role: Andrei Cruz (PGY-4)    Procedure Summary  Anesthesia: * No anesthesia type entered *  ASA: ASA status not filed in the log.  Anesthesia Staff: No anesthesia staff entered.  Estimated Blood Loss: 800 mL    Intraprocedure I/O Totals        mL       IVF        2000 mL      UOP                                                                                     250 mL     Specimen: No specimens collected     Staff:   No surgical staff documented.    Informed Consent:  The risks, benefits, complications, and alternatives were discussed with the patient. The patient understood that the risks of  section include, but are not limited to: injury to nearby structures or organs, infection, blood loss and possible need for transfusion, and potential need for more surgery including hysterectomy. The patient stated understanding and desired to proceed. All questions were answered. The site of surgery was properly noted and marked. The patient was identified as Lexi Licona and the procedure verified as a  delivery. A Time Out was held and the above information confirmed.    Decision for  Delivery  32 yo  @ 34.1 wga presented for vaginal bleeding in setting of known placenta previa. Pt with regular  contractions on toco. Decision was made to proceed with primary low transverse  delivery.    Findings: Normal appearing gravid uterus, fallopian tubes, and ovaries. Amniotic fluid clear, male infant in cephalic presentation, Apgars 9/9.    Procedure:   Patient was taken to the OR where combined spinal epidural anesthesia was administered.  She was then placed in the dorsal supine position with a left lateral tilt. A west catheter was placed, SCDs were applied, and a vaginal prep was performed. A pre-procedure time out was performed.  The patient was given prophylactic dose of IV antibiotics. She was then prepped and draped in the usual sterile fashion. A Pfannenstiel skin incision was made through the skin with the scalpel and then carried through the subcutaneous fat to the underlying fascial layer with sharp dissection. The fascia was incised on either side of the midline with the scalpel, and fascia was then dissected off the rectus muscle bilaterally bluntly. The muscles were divided in the midline, the peritoneum was identified and then entered bluntly, and incision extended superiorly and inferiorly with good visualization of bladder below. Bladder blade was inserted. A low transverse uterine incision was made with the scalpel, the uterine cavity was entered, and the hysterotomy was extended bilaterally with cephalocaudal stretching. The infant was delivered atraumatically, the cord was clamped and cut, and infant was handed off to the awaiting nursing staff. A cord segment and blood sample were collected. The placenta was then expressed. The uterus was exteriorized and cleared of all clot and debris. The uterine incision was repaired using a running locked stitch of 0-Vicryl. Good hemostasis was noted. The uterus was then placed back inside the pelvis, the gutters were cleared of all clots and debris. The hysterotomy was again evaluated and found to be hemostatic, and the underside of the fascia and  bladder and the rectus muscles were also found to be hemostatic. The fascia was closed using a running stitch of 0-Vicryl. The subcutaneous layer was irrigated, small bleeding vessels were cauterized. The skin was closed with 4-0 Monocryl.  All counts were correct, the patient tolerated the procedure well. Dr. Banks was present for all key portions of the procedure. Upon completion of the  delivery, the patient's cerclage was removed without difficulty. The patient and infant were taken back to the recovery room in stable condition.     Delivery Summary    Gestational Age: 34w1d  /Para:   Estimated Blood Loss:   Delivery Blood Loss  10/26/23 1255 - 10/26/23 1443      Total Blood Loss - Surgical Delivery (mL) Anesthesia 800 mL    Total  800 mL          Quantitative Blood Loss: Admission to Discharge: 800 mL (10/26/2023 12:05 PM - 10/26/2023  3:55 PM)    Frank Licona [[id number]]      Labor Events    Sac identifier: Sac 1  Rupture date/time: 10/26/2023 1344  Rupture type: Artificial  Fluid color: Clear  Fluid odor: None  Labor type:  Without Labor  Labor allowed to proceed with plans for an attempted vaginal birth?: No       Placenta    Placenta delivery date/time: 10/26/2023 1346  Placenta removal: Manual removal  Placenta appearance: Intact       Cord    Vessels: 3 vessels  Complications: None  Delayed cord clamping?: No  Cord clamped date/time: 10/26/2023 1345  Cord blood disposition: Lab  Gases sent?: Yes  Cord comments: no delayed cord clamping per pranav banks MD  Stem cell collection (by provider): No       Anesthesia    Method: Combined spinal-epidural       Operative Delivery    Forceps attempted?: No  Vacuum extractor attempted?: No       Shoulder Dystocia    Shoulder dystocia present?: No        Delivery    Birth date/time: 10/26/2023 13:45:00  Delivery type: , Low Transverse       Resuscitation    Method: Suctioning, Continuous positive airway pressure  (CPAP), Supplemental oxygen, Tactile stimulation       Apgars    Living status: Living  Apgar Component Scores:  1 min.:  5 min.:  10 min.:  15 min.:  20 min.:    Skin color:  1  1       Heart rate:  2  2       Reflex irritability:  2  2       Muscle tone:  2  2       Respiratory effort:  2  2       Total:  9  9       Apgars assigned by: TRENT BENEDICT       Delivery Providers    Delivering clinician: Anahy Morillo MD   Provider Role    Marlena Virgen RN Delivery Nurse    Fara Vasquez RN Nursery Nurse    Andrei Cruz MD Resident                 Andrei Cruz MD

## 2023-10-27 ENCOUNTER — ANESTHESIA (OUTPATIENT)
Dept: POSTPARTUM | Facility: HOSPITAL | Age: 33
End: 2023-10-27
Payer: COMMERCIAL

## 2023-10-27 ENCOUNTER — ANESTHESIA EVENT (OUTPATIENT)
Dept: POSTPARTUM | Facility: HOSPITAL | Age: 33
End: 2023-10-27
Payer: COMMERCIAL

## 2023-10-27 LAB
ERYTHROCYTE [DISTWIDTH] IN BLOOD BY AUTOMATED COUNT: 12.9 % (ref 11.5–14.5)
HCT VFR BLD AUTO: 28.7 % (ref 36–46)
HGB BLD-MCNC: 9.9 G/DL (ref 12–16)
MCH RBC QN AUTO: 32.7 PG (ref 26–34)
MCHC RBC AUTO-ENTMCNC: 34.5 G/DL (ref 32–36)
MCV RBC AUTO: 95 FL (ref 80–100)
NRBC BLD-RTO: 0 /100 WBCS (ref 0–0)
PLATELET # BLD AUTO: 199 X10*3/UL (ref 150–450)
PMV BLD AUTO: 9.9 FL (ref 7.5–11.5)
RBC # BLD AUTO: 3.03 X10*6/UL (ref 4–5.2)
WBC # BLD AUTO: 10.2 X10*3/UL (ref 4.4–11.3)

## 2023-10-27 PROCEDURE — 2500000004 HC RX 250 GENERAL PHARMACY W/ HCPCS (ALT 636 FOR OP/ED): Performed by: STUDENT IN AN ORGANIZED HEALTH CARE EDUCATION/TRAINING PROGRAM

## 2023-10-27 PROCEDURE — 2500000001 HC RX 250 WO HCPCS SELF ADMINISTERED DRUGS (ALT 637 FOR MEDICARE OP): Performed by: STUDENT IN AN ORGANIZED HEALTH CARE EDUCATION/TRAINING PROGRAM

## 2023-10-27 PROCEDURE — 36415 COLL VENOUS BLD VENIPUNCTURE: CPT | Performed by: STUDENT IN AN ORGANIZED HEALTH CARE EDUCATION/TRAINING PROGRAM

## 2023-10-27 PROCEDURE — 1100000001 HC PRIVATE ROOM DAILY

## 2023-10-27 PROCEDURE — 85027 COMPLETE CBC AUTOMATED: CPT | Performed by: STUDENT IN AN ORGANIZED HEALTH CARE EDUCATION/TRAINING PROGRAM

## 2023-10-27 RX ORDER — CLONAZEPAM 0.5 MG/1
0.5 TABLET ORAL DAILY PRN
Status: DISCONTINUED | OUTPATIENT
Start: 2023-10-27 | End: 2023-10-27

## 2023-10-27 RX ORDER — DIPHENHYDRAMINE HCL 25 MG
25 CAPSULE ORAL ONCE
Status: COMPLETED | OUTPATIENT
Start: 2023-10-27 | End: 2023-10-27

## 2023-10-27 RX ORDER — CLONAZEPAM 0.5 MG/1
0.5 TABLET ORAL DAILY PRN
Status: DISCONTINUED | OUTPATIENT
Start: 2023-10-27 | End: 2023-10-28 | Stop reason: HOSPADM

## 2023-10-27 RX ORDER — GLYCERIN 1 G/1
1 SUPPOSITORY RECTAL DAILY PRN
Status: DISCONTINUED | OUTPATIENT
Start: 2023-10-27 | End: 2023-10-28 | Stop reason: HOSPADM

## 2023-10-27 RX ADMIN — ACETAMINOPHEN 975 MG: 325 TABLET ORAL at 16:24

## 2023-10-27 RX ADMIN — DIPHENHYDRAMINE HYDROCHLORIDE 25 MG: 25 CAPSULE ORAL at 14:01

## 2023-10-27 RX ADMIN — SIMETHICONE 80 MG: 80 TABLET, CHEWABLE ORAL at 22:05

## 2023-10-27 RX ADMIN — ACETAMINOPHEN 975 MG: 325 TABLET ORAL at 02:46

## 2023-10-27 RX ADMIN — ACETAMINOPHEN 975 MG: 325 TABLET ORAL at 09:03

## 2023-10-27 RX ADMIN — OXYCODONE HYDROCHLORIDE 5 MG: 5 TABLET ORAL at 22:04

## 2023-10-27 RX ADMIN — ACETAMINOPHEN 975 MG: 325 TABLET ORAL at 22:03

## 2023-10-27 RX ADMIN — IBUPROFEN 600 MG: 600 TABLET, FILM COATED ORAL at 22:04

## 2023-10-27 RX ADMIN — IBUPROFEN 600 MG: 600 TABLET, FILM COATED ORAL at 16:24

## 2023-10-27 RX ADMIN — OXYCODONE HYDROCHLORIDE 5 MG: 5 TABLET ORAL at 14:02

## 2023-10-27 RX ADMIN — KETOROLAC TROMETHAMINE 30 MG: 30 INJECTION, SOLUTION INTRAMUSCULAR; INTRAVENOUS at 09:09

## 2023-10-27 RX ADMIN — KETOROLAC TROMETHAMINE 30 MG: 30 INJECTION, SOLUTION INTRAMUSCULAR; INTRAVENOUS at 02:46

## 2023-10-27 ASSESSMENT — PAIN SCALES - GENERAL
PAINLEVEL_OUTOF10: 0 - NO PAIN
PAINLEVEL_OUTOF10: 0 - NO PAIN
PAINLEVEL_OUTOF10: 4
PAINLEVEL_OUTOF10: 3
PAINLEVEL_OUTOF10: 4
PAINLEVEL_OUTOF10: 7

## 2023-10-27 ASSESSMENT — PAIN DESCRIPTION - DESCRIPTORS: DESCRIPTORS: ACHING;SORE

## 2023-10-27 NOTE — PROGRESS NOTES
Lexi Licona is a 33 y.o., , who had a , Low Transverse  delivery on 10/26/2023  at 34w1d and is now POD1.    She had CSE without immediate complications noted.       Pain was appropriately tolerated by patient.     Vitals:    10/27/23 0557   BP: 99/64   Pulse: 71   Resp: 16   Temp: 36.6 °C (97.9 °F)   SpO2: 98%       Neuraxial site assessed. No visible redness or swelling. Pain acceptably controlled. Patient able to ambulate and move all extremities without difficulty. Able to void. No complaints of nausea/vomiting. Tolerating PO intake well. No s/sx of PDPH.     Neuraxial site without redness, drainage, swelling.  Neuromuscular block resolved.    Anesthesia will sign off     ANIYAH Barksdale

## 2023-10-27 NOTE — LACTATION NOTE
Lactation Consultant Note  Lactation Consultation  Reason for Consult: Initial assessment, Other (Comment) (Attempt-mother is asleep)  Consultant Name: Angélica Powers    Maternal Information       Maternal Assessment       Infant Assessment       Feeding Assessment       LATCH TOOL       Breast Pump  Pump: Hospital grade electric pump    Other OB Lactation Tools  Lactation Tools: Flanges    Patient Follow-up  Inpatient Lactation Follow-up Needed : Yes  Outpatient Lactation Follow-up: Recommended    Other OB Lactation Documentation  Infant Risk Factors: Prematurity <37 weeks, Low birth weight <2500 g    Recommendations/Summary  Mother asleep at this time; left information for outpatient lactation support and letter to indicate that patient may call for further assistance. She does have the breast pump set up in her room. Ongoing support offered per LC.

## 2023-10-27 NOTE — CARE PLAN
The patient's goals for the shift include see baby    The clinical goals for the shift include reduce nausea, increase nutrition      Problem: Postpartum  Goal: Experiences normal postpartum course  Outcome: Progressing  Goal: Appropriate maternal -  bonding  Outcome: Progressing  Goal: Establish and maintain infant feeding pattern for adequate nutrition  Outcome: Progressing  Goal: Incisions, wounds, or drain sites healing without S/S of infection  Outcome: Progressing  Goal: No s/sx infection  Outcome: Progressing  Goal: No s/sx of hemorrhage  Outcome: Progressing  Goal: Minimal s/sx of HDP and BP<160/110  Outcome: Progressing     Problem:  Recovery Care  Goal: Verbalizes understanding of post-op instructions  Outcome: Progressing  Goal: Manages discomfort  Outcome: Progressing  Goal: Dressing intact until removed with any drainage marked  Outcome: Progressing  Goal: Patient vital signs are stable  Outcome: Met  Goal: Urine output is 0.5 mL/kg/hr or more  Outcome: Progressing  Goal: Fundus firm at midline  Outcome: Progressing     Problem: Anemia in pregnancy  Goal: Tolerates treatment for anemia  Outcome: Progressing     Problem: Hypertensive Disorder of Pregnancy (HDP)  Goal: Minimal s/sx of HDP and BP<160/110  Outcome: Progressing  Goal: Adequate urine output (0.5 ml/kg/hr)  Outcome: Progressing     Problem: Nausea/Vomiting  Goal: Adequate urine output (0.5 ml/kg/hr)  Outcome: Progressing  Goal: Free from nausea/vomiting  Outcome: Progressing  Goal: Tolerates prescribed diet  Outcome: Progressing     Problem: Infection  Goal: Fever/diaphoresis will improve to <38.0 C  Outcome: Progressing  Goal: Wound will have less exudate and warmth  Outcome: Progressing  Goal: Improvement in s/sx of infection  Outcome: Progressing     Problem: UH VAGINAL BLEEDING/HEMORRHAGE AP  Goal: Fewer then 4-6 ct per hour  Outcome: Met  Goal: No s/sx of hemorrhage  Outcome: Met     Problem: Postpartum hemorrhage  Goal:  Hemodynamic stability and limit blood loss  Outcome: Progressing     Problem: Pain - Adult  Goal: Verbalizes/displays adequate comfort level or baseline comfort level  Outcome: Progressing     Problem: Safety - Adult  Goal: Free from fall injury  Outcome: Progressing     Problem: Discharge Planning  Goal: Discharge to home or other facility with appropriate resources  Outcome: Progressing

## 2023-10-27 NOTE — PROGRESS NOTES
Postpartum Progress Note    Assessment/Plan   Lexi Licona is a 33 y.o., , who was admitted for VB in s/o known previa, delivered at 34w1d gestation and is now postpartum day 1 s/p PLTCS.     Routine postpartum care  - meeting all postpartum and post-op milestones  - voiding spontaneously, tolerating PO diet  - bonding with male infant  - pain well controlled on po medications  - DVT Score: 2 - encourage ambulation and  SCDs  - O+, Rhogam not indicated  - admission hgb 12.8 -> POD#1 9.9, PO Fe at DC  - PPBC: partner vasectomy     Maternal Well-Being  - emotional support provided  - hx of anxiety and depression, on Zoloft and prn Klonopin, follows with psych at [facility]    [facility]  - breastfeeding/pumping encouraged; lactation consult prn    Dispo:  anticipate d/c on POD #3 if meeting all postpartum milestones, for f/u 2 weeks for incision check and 1 month with Primary OB provider    INGRID Villalba    Principal Problem:    Pregnancy, antepartum, complications, third trimester  Active Problems:    Short cervix affecting pregnancy    Placenta previa in third trimester    Pregnancy Problems (from 04/10/23 to present)       Problem Noted Resolved    Pregnancy, antepartum, complications, third trimester 10/26/2023 by Taylor Johnson MD No    Priority:  Medium      Prior  labor, antepartum 10/7/2023 by Sandie Morgan MD No    Priority:  Medium      Overview Addendum 10/16/2023  8:45 PM by ZOIE Au     H/o 34 wga delivery  See short cervix dx          Short cervix affecting pregnancy 10/2/2023 by ZOIE Au No    Priority:  Medium      Overview Addendum 10/17/2023 10:46 AM by ZOIE Au       - short cervix seen at 16 weeks, s/p cerclage placement   - initially desired elective steroids at 34w, reviewed today and pt feels more confident with delivery at 36 wks, thoroughly counseled. Does not desire steroids now  - will plan for first dose here in  office and partner will give second one at home   -Plan for cerclage removal at the time of CD           Placenta previa in third trimester 10/2/2023 by ZOIE Au No    Priority:  Medium      Overview Addendum 10/17/2023 10:46 AM by ZOIE Au     -Velamentous cord insertion vs marginal cord insertion on US today- Still present, re-reviewed finding of persistent previa on US as well as finding on US of anterior PCI and risk of developing vasa previa. Reviewed implications of persistent placenta previa as well as vasa previa and impact on delivery timing and method.   - Discussed if remains a previa and no bleeding plan for 36-37w. Discussed likely plan for C/D on 11/9 with Dr. Kaplan         Supervision of high risk pregnancy in third trimester 10/2/2023 by ZOIE Au No    Priority:  Medium      Overview Addendum 10/23/2023  9:32 PM by ZOIE Au     - PNB completed. Reviewed lab results. Normal. 1hr gtt 104. Hgb WNL.   - last pap ASCUS pos [facility], s/p normal colpo. Due in 9/23, will get PP   - s/p tdap vaccine  -Growth AGA- see report for details    -GBS completed in triage on 10/7-negative   - PNV weekly until delivery   -CD scheduled on 11/9 with Dr. Kaplan          Anxiety in pregnancy in third trimester, antepartum 10/3/2023 by ZOIE Au 10/7/2023 by Sandie Morgan MD          Subjective   Her pain is well controlled with current medications  She is passing flatus  She is ambulating well  She is tolerating a Adult diet Regular  She reports no breast or nursing problems  She denies emotional concerns today     Objective   Allergies:   Ceclor [cefaclor]         Last Vitals:  Temp Pulse Resp BP MAP Pulse Ox   37 °C (98.6 °F) 77 16 100/65   98 %     Vitals Min/Max Last 24 Hours:  Temp  Min: 36.4 °C (97.5 °F)  Max: 37 °C (98.6 °F)  Pulse  Min: 71  Max: 78  Resp  Min: 16  Max: 16  BP  Min: 87/49  Max: 111/71    Intake/Output:     Intake/Output  Summary (Last 24 hours) at 10/27/2023 1855  Last data filed at 10/27/2023 0750  Gross per 24 hour   Intake --   Output 1400 ml   Net -1400 ml       Physical Exam:  General: well appearing, well-nourished, postpartum  Obstetric: abdomen soft/non-tender, fundus firm below umbilicus, lochia light, Pfannenstiel incision c/d/i  Skin: No rashes/lesions/erythema  Neuro: A/Ox3, conversational  GI: +BS, +flatus  Respiratory: Even and unlabored on RA, LSCTA BL  Cardiovascular: RRR, normal S1, S2  Extremities: No edema, discoloration, or pain in BLE, equal and palpable DP and PT pulses    Psych: appropriate mood and affect     Lab Data:  Lab Results   Component Value Date    WBC 10.2 10/27/2023    HGB 9.9 (L) 10/27/2023    HCT 28.7 (L) 10/27/2023     10/27/2023

## 2023-10-27 NOTE — PROGRESS NOTES
"  Wednesday, March 15th, 2017     Study: "A Phase 3, randomized, multicenter, double-blind, placebo-controlled, 2-arm, efficacy and safety study of [id number] plus standard of care versus placebo plus standard of care in subjects with light chain (AL) amyloidosis"  Protocol ID# [id number]  IRB# 2015.305.A  Sponsor: Eliseo  Investigator: Dr. Carmichael    Month 2, Day 1  IV [id number] + SQ Velcade    Patient presents for month 2, day 1 treatment per above-mentioned protocol.  Patient states he is doing "okay" with some increased shortness of breath and "lightheadedness."  He states his RLS symptoms have remained stable and does not wish to further pursue this at this time.  He is ambulatory, awake, alert, and oriented to person, place, and time, accompanied by self.  Lower extremity edema stable.  He states fatigue is present but not limiting his activities.  Trial Slate and questionnaires (FACT-GOG NTX and VASPI) performed prior to any study related procedures.  Patient had centrally required blood work for bioanalytical testing completed and was processed by EVELIA Ignacio, . Plan of care for the day reviewed with patient.  He states understanding.  Patient states continued willingness to participate in above-mentioned trial.    Review of AE's:    Restless Leg Syndrome, grade 1:  As stated above, patient states this is stable.  Occurs 2-3 nights per week, but tolerable.  AE ongoing.  Platelet Count decreased, grade 1:  Platelet count 070960 today.  Remains grade 1.  AE ongoing.  Total Bilirubin Increased, Grade 2:  Total bilirubin 1.8 per local blood work performed today.  Noted as NCS per Dr. Carmichael.  AE grade 2, will follow.  Hyperglycemia, Grade 1: Glucose WNL today.  Resolved for now. AE resolved    No other change in grade in baseline AE's    Physical performed per Dr. Carmichael, see MD note for Complete PE, symptom-directed PE, ECOG PS and NYHA Classification.  See flowsheets for height, weight, and " Postpartum Progress Note    Assessment/Plan   Lexi Licona is a 33 y.o., , who was admitted for vaginal bleeding in the setting of placenta previa and urgent caesarian section, delivered at 34w1d gestation and is now postpartum day 1 s/p caesarian section.     The patient is doing well in terms of pain control, eating, voiding, passing flatus, ambulating, and appears reassuring on physical exam. The main concerns from the patient today are in regards to her chronic anxiety and availability of appropriate medications, ability to pass stool, and post epidural pruritus.    Plan:  #post-op day 1 caesarian section  -cont scheduled tylenol/ibuprofen, PRN oxycodone   -cont PRN benadryl   -order glycerin suppository  -scheduled for discharge 10/29    #chronic anxiety  -encourage patient to call psych provider for refill of clonazepam 0.5 mg 1/day    #post partum BC  -partner vasectomy      Principal Problem:    Pregnancy, antepartum, complications, third trimester  Active Problems:    Short cervix affecting pregnancy    Placenta previa in third trimester    Pregnancy Problems (from 04/10/23 to present)       Problem Noted Resolved    Pregnancy, antepartum, complications, third trimester 10/26/2023 by Taylor Johnson MD No    Prior  labor, antepartum 10/7/2023 by Sandie Morgan MD No    Overview Addendum 10/16/2023  8:45 PM by ZOIE Au     H/o 34 wga delivery  See short cervix dx          Short cervix affecting pregnancy 10/2/2023 by ZOIE Au No    Overview Addendum 10/17/2023 10:46 AM by ZOIE Au       - short cervix seen at 16 weeks, s/p cerclage placement   - initially desired elective steroids at 34w, reviewed today and pt feels more confident with delivery at 36 wks, thoroughly counseled. Does not desire steroids now  - will plan for first dose here in office and partner will give second one at home   -Plan for cerclage removal at the time of CD            "vital signs.  NIS-LL and VASPI completed, see chart for source documentation. Patient denies numbness/tingling secondary to Velcade.  Patient is not WOCBP, and no blood work required today per Dr. Carmichael, therefore patient had no local labs drawn.  Central labs and spot [facility]   Concomitant medications reviewed.  Dr. Carmichael has changed patient's lasix to once per day in light of new reports of increased lightheadedness.  Patient confirms that per Dr. Carmichael, he has initiated acyclovir.  Per Dr. Carmichael, patient is approved to initiate treatment today, 2/15/17.  Treatment plan updated with today's weight and signed.  Benadryl adjusted to PO due to previous experience with RLS.      Patient expressing some concern today about "not seeing any improvement in my symptoms yet."  Dr. Carmichael reiterated that it could take up to 6 months to see improvement from this treatment and that patient should try to have patience with this.  He states understanding.      Patient then proceeded to infusion suite.  Study-mandated procedures performed per protocol as follows:    0958:  Premeds administered, see MAR  1038:  Pre-dose vital signs performed, see flowsheet.  Vitals read as follows:  BP 98/59, HR 79, Temp 98.3, RR 18.  1030:  Triplicate EKG performed per research staff.  *Transmission issue was discussed with DANNIELLE Gu and ERT staff.  Per ERT staff, tracings will be automatically extracted.  New machine will be sent to site for future transmissions.    1040:  Study drug infusion initiated   1145:  Infusion completed +5 minutes, well within 10 minute window..  1145:  Vital signs at end of infusion performed.  Vitals as follows:  113/72, 77, 18  1146:  First PK drawn and processed per EVELIA Ignacio .  1245:  1 hour post infusion VS performed:  /64, HR 77, T 97.7, RR 18  1245:  1 hour post infusion PK performed and processed per .    Patient was monitored for the full " Placenta previa in third trimester 10/2/2023 by ZOIE Au No    Overview Addendum 10/17/2023 10:46 AM by ZOIE Au     -Velamentous cord insertion vs marginal cord insertion on US today- Still present, re-reviewed finding of persistent previa on US as well as finding on US of anterior PCI and risk of developing vasa previa. Reviewed implications of persistent placenta previa as well as vasa previa and impact on delivery timing and method.   - Discussed if remains a previa and no bleeding plan for 36-37w. Discussed likely plan for C/D on  with Dr. Kaplan         Supervision of high risk pregnancy in third trimester 10/2/2023 by ZOIE Au No    Overview Addendum 10/23/2023  9:32 PM by ZOIE Au     - PNB completed. Reviewed lab results. Normal. 1hr gtt 104. Hgb WNL.   - last pap ASCUS pos [facility], s/p normal colpo. Due in , will get PP   - s/p tdap vaccine  -Growth AGA- see report for details    -GBS completed in triage on 10/7-negative   - PNV weekly until delivery   -CD scheduled on  with Dr. Kaplan          Anxiety in pregnancy in third trimester, antepartum 10/3/2023 by ZOIE Au 10/7/2023 by Sandie Morgan MD          Hospital course: no complications   section delivery  Patient is currently breastfeedingThe patient's blood type is O POS. The baby's blood type is O POS . Rhogam is not indicated.    Subjective   Her pain is well controlled with current medications, has not had to use PRN oxycodone  She has 3/10 dull tenderness in lower abdominal region  She reports no redness, bleeding, or oozing at the incision site (dressing off)  She is ambulating well, was able to shower  She is tolerating a Adult diet Regular  She reports no breast or nursing problems  She voided twice and reports complete emptying  She is passing flatus  She has not had a bowel movement yet, she said after her last pregnancy that she used a glycerin  90 minutes post infusion (until 1315) and tolerated the above with no difficulties, see infusion RN documentation.  Patient then received SQ Velcade injection, see MAR.       All of patient's questions were answered to his satisfaction.  Upcoming appointment calendar reviewed and given to patient.  Patient states understanding of plan of care and upcoming schedule.  He states having Research RN contact information as well as that of Dr. Carmichael.                    suppository to help soften her stool and would like one again, prefers glycerin suppository  She says that her anxiety is always high after pregnancy and that she takes clonazepam 0.5 mg 1/day post partum. Says she would like to have a refill of that medication to take her to early December (when she sees psych again.)  She was nauseated yesterday afternoon, but has no nausea after two doses of Zofran yesterday.  Is still having pruritus post-epidural, would like a second dose of benadryl after seeing baby    Objective   Allergies:   Ceclor [cefaclor]         Last Vitals:  Temp Pulse Resp BP MAP Pulse Ox   36.6 °C (97.9 °F) 71 16 99/64   98 %     Vitals Min/Max Last 24 Hours:  Temp  Min: 36 °C (96.8 °F)  Max: 36.6 °C (97.9 °F)  Pulse  Min: 65  Max: 120  Resp  Min: 16  Max: 18  BP  Min: 86/52  Max: 116/68    Intake/Output:     Intake/Output Summary (Last 24 hours) at 10/27/2023 0805  Last data filed at 10/27/2023 0249  Gross per 24 hour   Intake 2364.9 ml   Output 2290 ml   Net 74.9 ml       Physical Exam:  General: well appearing, conversant, in no acute distress  Lungs: CTAB, no excessive effort on respiration  CV: RRR, normal S1/S2, no murmurs, rubs, or gallops  Abd: no additional tenderness to palpation, slightly hypoactive bowel sounds. Incision site is non-erythematous, is not oozing or bleeding  Skin: no urticaria or discoloration, warm and dry    Lab Data:  Lab Results   Component Value Date    WBC 11.3 10/26/2023    HGB 12.8 10/26/2023    HCT 37.4 10/26/2023     10/26/2023

## 2023-10-28 VITALS
SYSTOLIC BLOOD PRESSURE: 109 MMHG | HEIGHT: 62 IN | HEART RATE: 72 BPM | OXYGEN SATURATION: 98 % | RESPIRATION RATE: 16 BRPM | DIASTOLIC BLOOD PRESSURE: 74 MMHG | WEIGHT: 140 LBS | TEMPERATURE: 98.4 F | BODY MASS INDEX: 25.76 KG/M2

## 2023-10-28 PROBLEM — O44.03 PLACENTA PREVIA IN THIRD TRIMESTER (HHS-HCC): Status: RESOLVED | Noted: 2023-10-02 | Resolved: 2023-10-28

## 2023-10-28 PROBLEM — O26.879 SHORT CERVIX AFFECTING PREGNANCY (HHS-HCC): Status: RESOLVED | Noted: 2023-10-02 | Resolved: 2023-10-28

## 2023-10-28 PROBLEM — O26.93 PREGNANCY, ANTEPARTUM, COMPLICATIONS, THIRD TRIMESTER (HHS-HCC): Status: RESOLVED | Noted: 2023-10-26 | Resolved: 2023-10-28

## 2023-10-28 PROCEDURE — 2500000001 HC RX 250 WO HCPCS SELF ADMINISTERED DRUGS (ALT 637 FOR MEDICARE OP): Performed by: STUDENT IN AN ORGANIZED HEALTH CARE EDUCATION/TRAINING PROGRAM

## 2023-10-28 RX ORDER — ACETAMINOPHEN 500 MG
1000 TABLET ORAL EVERY 6 HOURS PRN
Qty: 120 TABLET | Refills: 0 | Status: SHIPPED | OUTPATIENT
Start: 2023-10-28

## 2023-10-28 RX ORDER — IBUPROFEN 600 MG/1
600 TABLET ORAL EVERY 6 HOURS PRN
Qty: 120 TABLET | Refills: 0 | Status: SHIPPED | OUTPATIENT
Start: 2023-10-28 | End: 2023-11-27

## 2023-10-28 RX ORDER — OXYCODONE HYDROCHLORIDE 5 MG/1
5 TABLET ORAL EVERY 6 HOURS PRN
Qty: 5 TABLET | Refills: 0 | Status: SHIPPED | OUTPATIENT
Start: 2023-10-28 | End: 2023-11-02

## 2023-10-28 RX ORDER — SIMETHICONE 125 MG
125 TABLET,CHEWABLE ORAL EVERY 6 HOURS PRN
Qty: 30 TABLET | Refills: 0 | Status: SHIPPED | OUTPATIENT
Start: 2023-10-28 | End: 2024-04-25 | Stop reason: WASHOUT

## 2023-10-28 RX ADMIN — ACETAMINOPHEN 975 MG: 325 TABLET ORAL at 04:52

## 2023-10-28 RX ADMIN — SIMETHICONE 80 MG: 80 TABLET, CHEWABLE ORAL at 11:20

## 2023-10-28 RX ADMIN — IBUPROFEN 600 MG: 600 TABLET, FILM COATED ORAL at 11:20

## 2023-10-28 RX ADMIN — ACETAMINOPHEN 975 MG: 325 TABLET ORAL at 11:21

## 2023-10-28 RX ADMIN — IBUPROFEN 600 MG: 600 TABLET, FILM COATED ORAL at 04:52

## 2023-10-28 RX ADMIN — SIMETHICONE 80 MG: 80 TABLET, CHEWABLE ORAL at 04:52

## 2023-10-28 RX ADMIN — OXYCODONE HYDROCHLORIDE 5 MG: 5 TABLET ORAL at 15:51

## 2023-10-28 RX ADMIN — OXYCODONE HYDROCHLORIDE 10 MG: 5 TABLET ORAL at 02:00

## 2023-10-28 ASSESSMENT — PAIN SCALES - GENERAL
PAINLEVEL_OUTOF10: 5 - MODERATE PAIN
PAINLEVEL_OUTOF10: 9
PAINLEVEL_OUTOF10: 6
PAINLEVEL_OUTOF10: 0 - NO PAIN
PAINLEVEL_OUTOF10: 4

## 2023-10-28 NOTE — DISCHARGE INSTRUCTIONS

## 2023-10-28 NOTE — DISCHARGE SUMMARY
Discharge Summary    Admission Date: 10/26/2023  Discharge Date: 10/28/23  Discharge Diagnosis:  delivery delivered     Patient Active Problem List   Diagnosis    Supervision of high risk pregnancy in third trimester    Prior  labor, antepartum     delivery delivered       Hospital Course  Admission Date: 10/26/2023    Delivery Date: 10/26/2023  1:45 PM     Delivery type: , Low Transverse      GA at delivery: 34w1d    Outcome: Living     Anesthesia during delivery: Combined spinal-epidural     Intrapartum complications: None     Feeding method: Breastfeeding Status: Yes    Contraception: Partner vasectomy    The patient's blood type is O POS. The baby's blood type is O POS . Rhogam is not indicated.     Lexi Licona is a 33 y.o., , who was admitted on 10/26/2023 and had a , Low Transverse  delivery on 10/26/2023  at 34w1d and is now postpartum day 2. She desires DC today if appropriate.  PP course uneventful.  She is meeting all postpartum milestones- voiding spontaneously, passing flatus, ambulating independently, tolerating PO intake, and pain is well controlled on PO meds. She denies CP, SOB, calf pain, fever, passage of large clots. OK for DC today and follow up as below.    Dispo  2 week incision check and 6wk postpartum follow-up.     Pertinent Physical Exam At Time of Discharge  General: well appearing, well-nourished, postpartum  Obstetric: fundus firm below umbilicus, lochia light  Skin: Warm, dry; no rashes/lesions/erythema  Breast: No masses, nipple discharge  Neuro: A/Ox3, conversational  GI: +BS, abdomen soft, non-tender,   Respiratory: Even and unlabored on RA, LSCTA BL  Cardiovascular: Trace BLE edema; No erythema, warmth  Psych: appropriate mood and affect       Your medication list        START taking these medications        Instructions Last Dose Given Next Dose Due   acetaminophen 500 mg tablet  Commonly known as: Tylenol      Take 2 tablets (1,000  mg) by mouth every 6 hours if needed for moderate pain (4 - 6).       ibuprofen 600 mg tablet      Take 1 tablet (600 mg) by mouth every 6 hours if needed for moderate pain (4 - 6) (pain).       oxyCODONE 5 mg immediate release tablet  Commonly known as: Roxicodone      Take 1 tablet (5 mg) by mouth every 6 hours if needed for severe pain (7 - 10) for up to 5 days.       simethicone 125 mg chewable tablet  Commonly known as: Mylicon      Chew 1 tablet (125 mg) every 6 hours if needed for flatulence.              CONTINUE taking these medications        Instructions Last Dose Given Next Dose Due   PRENATAL 19 ORAL                     Where to Get Your Medications        These medications were sent to [facility]/pharmacy #[id number] - [place] [place], [place] - [address]  [address] [address] [address]      Phone: [phone]   acetaminophen 500 mg tablet  ibuprofen 600 mg tablet  oxyCODONE 5 mg immediate release tablet  simethicone 125 mg chewable tablet           Outpatient Follow-Up  Future Appointments   Date Time Provider Department Center   10/31/2023  9:30 AM Ariana Washington APRN-CNP [id number]       BIANKA Nugent-MICHAEL

## 2023-10-31 ENCOUNTER — APPOINTMENT (OUTPATIENT)
Dept: MATERNAL FETAL MEDICINE | Facility: CLINIC | Age: 33
End: 2023-10-31
Payer: COMMERCIAL

## 2023-11-01 ENCOUNTER — LACTATION ENCOUNTER (OUTPATIENT)
Age: 33
End: 2023-11-01

## 2023-11-01 LAB
LABORATORY COMMENT REPORT: NORMAL
PATH REPORT.FINAL DX SPEC: NORMAL
PATH REPORT.GROSS SPEC: NORMAL
PATH REPORT.RELEVANT HX SPEC: NORMAL
PATH REPORT.TOTAL CANCER: NORMAL
RESIDENT REVIEW: NORMAL

## 2023-11-09 ENCOUNTER — CLINICAL SUPPORT (OUTPATIENT)
Dept: OBSTETRICS AND GYNECOLOGY | Facility: CLINIC | Age: 33
End: 2023-11-09
Payer: COMMERCIAL

## 2023-11-09 ENCOUNTER — LACTATION ENCOUNTER (OUTPATIENT)
Age: 33
End: 2023-11-09

## 2023-11-09 VITALS — DIASTOLIC BLOOD PRESSURE: 81 MMHG | SYSTOLIC BLOOD PRESSURE: 112 MMHG

## 2023-11-09 NOTE — PROGRESS NOTES
Patient here for her incision check. Delivered 2 weeks ago. Incision is clean dry and intact. Baby is currently in NICU but is going home today and she is very excited and in great spirits. Dr. Taylor came in to talk to the patient and offer congratulations and answer any questions she had.

## 2023-11-09 NOTE — LACTATION NOTE
This note was copied from a baby's chart.  Lactation Consultant Note        Recommendations/Summary       I spoke with FOB at baby's bedside.  Baby is most likely going home today.  Mom continues to pump and provide milk for the baby.  She was not at bedside but FOB reports that mom is not having difficulty with pumping.  Discharge follow up education was left with FOB.  He was invited to have mom seek Lactation support out patient as needed.

## 2023-11-29 DIAGNOSIS — Z30.011 ENCOUNTER FOR INITIAL PRESCRIPTION OF CONTRACEPTIVE PILLS: Primary | ICD-10-CM

## 2023-11-29 RX ORDER — DROSPIRENONE AND ETHINYL ESTRADIOL 0.03MG-3MG
1 KIT ORAL DAILY
Qty: 28 TABLET | Refills: 11 | Status: SHIPPED | OUTPATIENT
Start: 2023-11-29 | End: 2024-04-25 | Stop reason: WASHOUT

## 2023-12-12 ENCOUNTER — APPOINTMENT (OUTPATIENT)
Dept: MATERNAL FETAL MEDICINE | Facility: CLINIC | Age: 33
End: 2023-12-12
Payer: COMMERCIAL

## 2024-01-09 ENCOUNTER — APPOINTMENT (OUTPATIENT)
Dept: MATERNAL FETAL MEDICINE | Facility: CLINIC | Age: 34
End: 2024-01-09
Payer: COMMERCIAL

## 2024-01-30 ENCOUNTER — APPOINTMENT (OUTPATIENT)
Dept: MATERNAL FETAL MEDICINE | Facility: CLINIC | Age: 34
End: 2024-01-30
Payer: COMMERCIAL

## 2024-04-25 ENCOUNTER — OFFICE VISIT (OUTPATIENT)
Dept: OBSTETRICS AND GYNECOLOGY | Facility: CLINIC | Age: 34
End: 2024-04-25
Payer: COMMERCIAL

## 2024-04-25 VITALS
DIASTOLIC BLOOD PRESSURE: 68 MMHG | SYSTOLIC BLOOD PRESSURE: 102 MMHG | WEIGHT: 126.6 LBS | HEIGHT: 62 IN | BODY MASS INDEX: 23.3 KG/M2

## 2024-04-25 DIAGNOSIS — Z01.419 WELL WOMAN EXAM: Primary | ICD-10-CM

## 2024-04-25 PROCEDURE — 1036F TOBACCO NON-USER: CPT

## 2024-04-25 PROCEDURE — 99395 PREV VISIT EST AGE 18-39: CPT

## 2024-04-25 PROCEDURE — 88175 CYTOPATH C/V AUTO FLUID REDO: CPT

## 2024-04-25 PROCEDURE — 87624 HPV HI-RISK TYP POOLED RSLT: CPT

## 2024-04-25 RX ORDER — CLONAZEPAM 0.5 MG/1
0.5 TABLET ORAL 2 TIMES DAILY PRN
COMMUNITY
End: 2024-06-11 | Stop reason: WASHOUT

## 2024-04-25 ASSESSMENT — PAIN SCALES - GENERAL: PAINLEVEL: 0-NO PAIN

## 2024-04-25 NOTE — PROGRESS NOTES
"Assessment/Plan   Diagnoses and all orders for this visit:  Well woman exam  -     THINPREP PAP TEST (>30)    Encouraged to reach out to our office with any questions or concerns.   Encouraged patient to follow up annually or PRN    No follow-ups on file.    Taylor Wong, APRN-CNM     Subjective   Lexi Licona is a 33 y.o. female who is here for a routine exam.     Concerns today:  Patient reports constipation around devante that lasted about 6 weeks. All good now.     Patient's last menstrual period was 2024.   Periods are regular every 28-30 days, lasting 4 days.   Dysmenorrhea:none.   Cyclic symptoms include none.     Sexual Activity: sexually active, male partners; Patient reports 1 partners in the last 12 months.  Pain with intercourse? No   Loss of desire? No   Able to have an orgasm? Yes     History of prior STI:  HPV  Current contraception:  patient is currently cycle tracking, condoms   Last pap:   History of abnormal Pap smear: yes - history of Ascus +HPV, colpo normal. x2  Family history of uterine or ovarian cancer: no  Last mammogram: na  History of abnormal mammogram: no  Family history of breast cancer: no  Menstrual History:  OB History          2    Para   2    Term   0       2    AB   0    Living   2         SAB   0    IAB   0    Ectopic   0    Multiple   0    Live Births   2                Menarche age: 14  Patient's last menstrual period was 2024.     Objective   /68   Ht 1.575 m (5' 2\")   Wt 57.4 kg (126 lb 9.6 oz)   LMP 2024   Breastfeeding No   BMI 23.16 kg/m²     General:   Alert and oriented x 3   Heart:  Lungs: Regular rate, rhythm  Clear to auscultation bilaterally   Thyroid:  Breast: Euthyroid, normal shape and size  Symmetrical, no skin changes/nipple discharge, redness, tenderness, no masses palpated bilaterally   Abdomen: Soft, non tender. Surgical scar present from     Vulva: EGBUS normal   Vagina: Pink, normal discharge "   Cervix: No CMT   Uterus: Normal shape, size   Adnexa: NT bilaterally

## 2024-06-11 ENCOUNTER — OFFICE VISIT (OUTPATIENT)
Dept: OBSTETRICS AND GYNECOLOGY | Facility: CLINIC | Age: 34
End: 2024-06-11
Payer: COMMERCIAL

## 2024-06-11 VITALS
DIASTOLIC BLOOD PRESSURE: 66 MMHG | SYSTOLIC BLOOD PRESSURE: 114 MMHG | HEIGHT: 62 IN | WEIGHT: 122.2 LBS | BODY MASS INDEX: 22.49 KG/M2

## 2024-06-11 DIAGNOSIS — N32.81 OVERACTIVE BLADDER: Primary | ICD-10-CM

## 2024-06-11 LAB
POC APPEARANCE, URINE: CLEAR
POC BILIRUBIN, URINE: NEGATIVE
POC BLOOD, URINE: NEGATIVE
POC COLOR, URINE: COLORLESS
POC GLUCOSE, URINE: NEGATIVE MG/DL
POC KETONES, URINE: NEGATIVE MG/DL
POC LEUKOCYTES, URINE: NEGATIVE
POC NITRITE,URINE: NEGATIVE
POC PH, URINE: 7 PH
POC PROTEIN, URINE: NEGATIVE MG/DL
POC SPECIFIC GRAVITY, URINE: 1.02
POC UROBILINOGEN, URINE: 0.2 EU/DL

## 2024-06-11 PROCEDURE — 1036F TOBACCO NON-USER: CPT | Performed by: OBSTETRICS & GYNECOLOGY

## 2024-06-11 PROCEDURE — 81003 URINALYSIS AUTO W/O SCOPE: CPT | Performed by: OBSTETRICS & GYNECOLOGY

## 2024-06-11 PROCEDURE — 99213 OFFICE O/P EST LOW 20 MIN: CPT | Performed by: OBSTETRICS & GYNECOLOGY

## 2024-06-11 RX ORDER — ASPIRIN 81 MG
100 TABLET, DELAYED RELEASE (ENTERIC COATED) ORAL EVERY 12 HOURS PRN
COMMUNITY
Start: 2024-01-25

## 2024-06-11 ASSESSMENT — ENCOUNTER SYMPTOMS: FREQUENCY: 1

## 2024-06-11 ASSESSMENT — PAIN SCALES - GENERAL: PAINLEVEL: 0-NO PAIN

## 2024-06-11 NOTE — PROGRESS NOTES
Subjective   Patient ID: Lexi Licona is a 33 y.o. female who presents for Urinary Symptom (Pt reports frequent urination since  and has concerns).  Pt had  medical   EAB    She complains of frequent urination  She denies burning or dysuria     Pt admits it may be due  her stress and anxiety   Pt's UA was totally normal         Review of Systems   Genitourinary:  Positive for frequency.       Objective   Physical Exam  Constitutional:       Appearance: She is normal weight.   Pulmonary:      Effort: Pulmonary effort is normal.   Neurological:      Mental Status: She is alert.   Psychiatric:         Mood and Affect: Mood normal.         Assessment/Plan   Diagnoses and all orders for this visit:  Overactive bladder  -     Referral to Urogynecology; Future  -     POCT UA Automated manually resulted           Anuhska Steen MD 24 11:31 AM

## 2025-04-23 ENCOUNTER — APPOINTMENT (OUTPATIENT)
Dept: OBSTETRICS AND GYNECOLOGY | Facility: CLINIC | Age: 35
End: 2025-04-23
Payer: COMMERCIAL

## 2025-04-23 VITALS
SYSTOLIC BLOOD PRESSURE: 100 MMHG | HEIGHT: 62 IN | WEIGHT: 117.8 LBS | DIASTOLIC BLOOD PRESSURE: 64 MMHG | BODY MASS INDEX: 21.68 KG/M2

## 2025-04-23 DIAGNOSIS — Z01.419 WELL WOMAN EXAM WITH ROUTINE GYNECOLOGICAL EXAM: Primary | ICD-10-CM

## 2025-04-23 DIAGNOSIS — R10.2 PELVIC PAIN: ICD-10-CM

## 2025-04-23 PROCEDURE — 99395 PREV VISIT EST AGE 18-39: CPT

## 2025-04-23 PROCEDURE — 1036F TOBACCO NON-USER: CPT

## 2025-04-23 PROCEDURE — 3008F BODY MASS INDEX DOCD: CPT

## 2025-04-23 RX ORDER — CLONAZEPAM 0.5 MG/1
0.5 TABLET ORAL AS NEEDED
COMMUNITY
Start: 2025-03-11

## 2025-04-23 ASSESSMENT — ENCOUNTER SYMPTOMS
ENDOCRINE NEGATIVE: 0
ALLERGIC/IMMUNOLOGIC NEGATIVE: 0
CONSTITUTIONAL NEGATIVE: 0
CARDIOVASCULAR NEGATIVE: 0
GASTROINTESTINAL NEGATIVE: 0
PSYCHIATRIC NEGATIVE: 0
MUSCULOSKELETAL NEGATIVE: 0
EYES NEGATIVE: 0
HEMATOLOGIC/LYMPHATIC NEGATIVE: 0
NEUROLOGICAL NEGATIVE: 0
RESPIRATORY NEGATIVE: 0

## 2025-04-23 ASSESSMENT — PAIN SCALES - GENERAL: PAINLEVEL_OUTOF10: 0-NO PAIN

## 2025-04-23 NOTE — PROGRESS NOTES
"Subjective   Lexi Licona is a 34 y.o. female who is here for Annual Exam (Last pap 24).     Spotting with ovulation- just with wiping.   Periods are regular every 28-30 days, lasting 4 days.   Dysmenorrhea:none.   Cyclic symptoms include none.      Sexual Activity: sexually active, male partners; Patient reports 1 partners in the last 12 months.  Pain with intercourse? No   Loss of desire? No   Able to have an orgasm? Yes     PMH: patient reports that she has been followed up this past year for unexplained constipation with  painful stomach aches. Has been working [facility] to work up. GI had colonoscopy- all normal. GI pelvic floor very tight- has been following up with PFT once per week for a month, reports no change - maybe worse pain. Has visit with GI to discuss botox injections to relax the pelvis. Being tested for everything.      History of prior STI:  HPV  Current contraception:  patient is currently cycle tracking, condoms   Last pap: - WNL repeat due in  per asccp guidelines.   History of abnormal Pap smear: yes - history of Ascus +HPV, colpo normal. x2  Family history of uterine or ovarian cancer: no  Last mammogram: na  History of abnormal mammogram: no  Family history of breast cancer: no      OB History    Para Term  AB Living   2 2 0 2 0 2   SAB IAB Ectopic Multiple Live Births   0 0 0 0 2      Objective   /64   Ht 1.575 m (5' 2\")   Wt 53.4 kg (117 lb 12.8 oz)   LMP 2025 (Exact Date)      General:   Alert and oriented x 3   Heart:  Lungs: Regular rate, rhythm  Clear to auscultation bilaterally   Thyroid: Euthyroid, normal shape and size   Breast: Symmetrical, no skin changes/nipple discharge, redness, tenderness, no masses palpated bilaterally   Abdomen: Soft, non tender   Vulva: EGBUS normal   Vagina: Pink, normal discharge   Cervix: No CMT   Uterus: Normal shape, size   Adnexa: NT bilaterally     Assessment/Plan   Diagnoses and all orders " for this visit:  Well woman exam with routine gynecological exam  Pelvic pain  -     Referral to Urogynecology; Future  All patient questions answered.   Encouraged to reach out to our office with any questions or concerns.   Encouraged patient to follow up annually and PRN  MAHESH Bah

## 2025-04-25 ENCOUNTER — APPOINTMENT (OUTPATIENT)
Dept: OBSTETRICS AND GYNECOLOGY | Facility: CLINIC | Age: 35
End: 2025-04-25
Payer: COMMERCIAL

## 2026-04-29 ENCOUNTER — APPOINTMENT (OUTPATIENT)
Dept: OBSTETRICS AND GYNECOLOGY | Facility: CLINIC | Age: 36
End: 2026-04-29
Payer: COMMERCIAL